# Patient Record
Sex: FEMALE | Race: WHITE | Employment: FULL TIME | URBAN - METROPOLITAN AREA
[De-identification: names, ages, dates, MRNs, and addresses within clinical notes are randomized per-mention and may not be internally consistent; named-entity substitution may affect disease eponyms.]

---

## 2017-09-12 ENCOUNTER — HOSPITAL ENCOUNTER (OUTPATIENT)
Dept: LAB | Age: 47
Discharge: HOME | End: 2017-09-12
Payer: COMMERCIAL

## 2017-09-12 ENCOUNTER — OFFICE VISIT (OUTPATIENT)
Dept: FAMILY MEDICINE CLINIC | Age: 47
End: 2017-09-12

## 2017-09-12 VITALS
HEART RATE: 78 BPM | HEIGHT: 67 IN | RESPIRATION RATE: 19 BRPM | DIASTOLIC BLOOD PRESSURE: 83 MMHG | WEIGHT: 161 LBS | BODY MASS INDEX: 25.27 KG/M2 | OXYGEN SATURATION: 100 % | TEMPERATURE: 98 F | SYSTOLIC BLOOD PRESSURE: 130 MMHG

## 2017-09-12 DIAGNOSIS — R82.90 ABNORMAL URINALYSIS: ICD-10-CM

## 2017-09-12 DIAGNOSIS — Z76.89 ENCOUNTER TO ESTABLISH CARE: ICD-10-CM

## 2017-09-12 DIAGNOSIS — L03.113 CELLULITIS OF RIGHT UPPER EXTREMITY: ICD-10-CM

## 2017-09-12 DIAGNOSIS — R82.90 CLOUDY URINE: ICD-10-CM

## 2017-09-12 DIAGNOSIS — N89.8 VAGINAL ITCHING: ICD-10-CM

## 2017-09-12 DIAGNOSIS — T63.304A SPIDER BITE, UNDETERMINED INTENT, INITIAL ENCOUNTER: Primary | ICD-10-CM

## 2017-09-12 LAB
BILIRUB UR QL STRIP: NEGATIVE
GLUCOSE UR-MCNC: NEGATIVE MG/DL
KETONES P FAST UR STRIP-MCNC: NEGATIVE MG/DL
PH UR STRIP: 6 [PH] (ref 4.6–8)
PROT UR QL STRIP: NEGATIVE MG/DL
SP GR UR STRIP: 1 (ref 1–1.03)
UA UROBILINOGEN AMB POC: NORMAL (ref 0.2–1)
URINALYSIS CLARITY POC: CLEAR
URINALYSIS COLOR POC: YELLOW
URINE BLOOD POC: NEGATIVE
URINE LEUKOCYTES POC: NORMAL
URINE NITRITES POC: POSITIVE

## 2017-09-12 PROCEDURE — 87077 CULTURE AEROBIC IDENTIFY: CPT | Performed by: NURSE PRACTITIONER

## 2017-09-12 PROCEDURE — 87186 SC STD MICRODIL/AGAR DIL: CPT | Performed by: NURSE PRACTITIONER

## 2017-09-12 PROCEDURE — 87070 CULTURE OTHR SPECIMN AEROBIC: CPT | Performed by: NURSE PRACTITIONER

## 2017-09-12 PROCEDURE — 87086 URINE CULTURE/COLONY COUNT: CPT | Performed by: NURSE PRACTITIONER

## 2017-09-12 RX ORDER — SULFAMETHOXAZOLE AND TRIMETHOPRIM 800; 160 MG/1; MG/1
1 TABLET ORAL 2 TIMES DAILY
Qty: 20 TAB | Refills: 0 | Status: SHIPPED | OUTPATIENT
Start: 2017-09-12 | End: 2017-09-22

## 2017-09-12 RX ORDER — IBUPROFEN 200 MG
TABLET ORAL
COMMUNITY

## 2017-09-12 RX ORDER — MICONAZOLE NITRATE 2 %
1 CREAM WITH APPLICATOR VAGINAL
Qty: 45 G | Refills: 0 | Status: SHIPPED | OUTPATIENT
Start: 2017-09-12 | End: 2017-09-19

## 2017-09-12 RX ORDER — ESCITALOPRAM OXALATE 10 MG/1
10 TABLET ORAL DAILY
COMMUNITY
End: 2022-02-21 | Stop reason: SDUPTHER

## 2017-09-12 NOTE — LETTER
NOTIFICATION RETURN TO WORK  
 
9/12/2017 9:51 AM 
 
Ms. Terrance Villasenor [address] [address] To Whom It May Concern: 
 
Terrance Villasenor is currently under the care of [address] [place] [address]. She will return to work on: September 13, 2017 If there are questions or concerns please have the patient contact our office.  
 
 
 
Sincerely, 
 
 
Leslye Jones NP

## 2017-09-12 NOTE — MR AVS SNAPSHOT
Visit Information Date & Time Provider Department Dept. Phone Encounter #  
 9/12/2017  9:00 AM Dg Luong NP [id number] [id number] [phone] Follow-up Instructions Return in about 10 days (around 9/22/2017) for if symptoms do not improve. Upcoming Health Maintenance Date Due  
 PAP AKA CERVICAL CYTOLOGY 9/17/2018 DTaP/Tdap/Td series (2 - Td) 7/10/2020 Allergies as of 9/12/2017  Review Complete On: 9/12/2017 By: Dg Luong NP Severity Noted Reaction Type Reactions Epinephrine High 09/12/2017   Intolerance Anaphylaxis Egg  11/03/2016    Hives Per pt Penicillamine  09/12/2017   Intolerance Hives Prednisone  09/12/2017   Intolerance Anxiety Current Immunizations  Never Reviewed No immunizations on file. Not reviewed this visit You Were Diagnosed With   
  
 Codes Comments Spider bite, undetermined intent, initial encounter    -  Primary ICD-10-CM: V34.508Y ICD-9-CM: 989.5, E980.9 Cloudy urine     ICD-10-CM: R82.90 ICD-9-CM: 791.9 Abnormal urinalysis     ICD-10-CM: R82.90 ICD-9-CM: 791.9 Vaginal itching     ICD-10-CM: L29.8 ICD-9-CM: 698.1 Encounter to establish care     ICD-10-CM: Z76.89 
ICD-9-CM: V65.8 Vitals BP Pulse Temp Resp Height(growth percentile) Weight(growth percentile) 130/83 (BP 1 Location: Right arm, BP Patient Position: Sitting) 78 98 °F (36.7 °C) (Oral) 19 5' 6.75\" (1.695 m) 161 lb (73 kg) LMP SpO2 BMI OB Status Smoking Status 08/21/2017 (Exact Date) 100% 25.41 kg/m2 Having regular periods Never Smoker Vitals History BMI and BSA Data Body Mass Index Body Surface Area  
 25.41 kg/m 2 1.85 m 2 Preferred Pharmacy Pharmacy Name Phone [id number] [phone] Your Updated Medication List  
  
   
This list is accurate as of: 9/12/17  9:52 AM.  Always use your most recent med list.  
  
  
  
  
 LEXAPRO 10 mg tablet Generic drug:  escitalopram oxalate Take 10 mg by mouth daily. miconazole 2 % vaginal cream  
Commonly known as:  Jah Necessary Insert 1 Applicator into vagina nightly for 7 days. MOTRIN  mg tablet Generic drug:  ibuprofen Take  by mouth. trimethoprim-sulfamethoxazole 160-800 mg per tablet Commonly known as:  BACTRIM DS, SEPTRA DS Take 1 Tab by mouth two (2) times a day for 10 days. Prescriptions Sent to Pharmacy Refills  
 trimethoprim-sulfamethoxazole (BACTRIM DS, SEPTRA DS) 160-800 mg per tablet 0 Sig: Take 1 Tab by mouth two (2) times a day for 10 days. Class: Normal  
 Pharmacy: [id number] Samson Landau, [address] [place] Ph #: [phone] Route: Oral  
 miconazole (MICOTIN) 2 % vaginal cream 0 Sig: Insert 1 Applicator into vagina nightly for 7 days. Class: Normal  
 Pharmacy: [facility] #[id number] Samson Landau, [address] [place] Ph #: [phone] Route: Vaginal  
  
We Performed the Following AMB POC URINALYSIS DIP STICK AUTO W/ MICRO [24921 CPT(R)] Follow-up Instructions Return in about 10 days (around 9/22/2017) for if symptoms do not improve. To-Do List   
 09/12/2017 Microbiology:  CULTURE, URINE   
  
 09/12/2017 Microbiology:  CULTURE, WOUND W GRAM STAIN Patient Instructions Insect Stings and Bites: Care Instructions Your Care Instructions Stings and bites from bees, wasps, ants, and other insects often cause pain, swelling, redness, and itching. In some people, especially children, the redness and swelling may be worse. It may extend several inches beyond the affected area. But in most cases, stings and bites don't cause reactions all over the body. If you have had a reaction to an insect sting or bite, you are at risk for a reaction if you get stung or bitten again. Follow-up care is a key part of your treatment and safety. Be sure to make and go to all appointments, and call your doctor if you are having problems. It's also a good idea to know your test results and keep a list of the medicines you take. How can you care for yourself at home? · Do not scratch or rub the skin where the sting or bite occurred. · Put a cold pack or ice cube on the area. Put a thin cloth between the ice and your skin. For some people, a paste of baking soda mixed with a little water helps relieve pain and decrease the reaction. · Take an over-the-counter antihistamine, such as diphenhydramine (Benadryl) or loratadine (Claritin), to relieve swelling, redness, and itching. Calamine lotion or hydrocortisone cream may also help. Do not give antihistamines to your child unless you have checked with the doctor first. 
· Be safe with medicines. If your doctor prescribed medicine for your allergy, take it exactly as prescribed. Call your doctor if you think you are having a problem with your medicine. You will get more details on the specific medicines your doctor prescribes. · Your doctor may prescribe a shot of epinephrine to carry with you in case you have a severe reaction. Learn how and when to give yourself the shot, and keep it with you at all times. Make sure it has not . · Go to the emergency room anytime you have a severe reaction. Go even if you have given yourself epinephrine and are feeling better. Symptoms can come back. When should you call for help? Call 911 anytime you think you may need emergency care. For example, call if: 
· You have symptoms of a severe allergic reaction. These may include: 
¨ Sudden raised, red areas (hives) all over your body. ¨ Swelling of the throat, mouth, lips, or tongue. ¨ Trouble breathing. ¨ Passing out (losing consciousness). Or you may feel very lightheaded or suddenly feel weak, confused, or restless. Call your doctor now or seek immediate medical care if: 
· You have symptoms of an allergic reaction not right at the sting or bite, such as: ¨ A rash or small area of hives (raised, red areas on the skin). ¨ Itching. ¨ Swelling. ¨ Belly pain, nausea, or vomiting. · You have a lot of swelling around the site (such as your entire arm or leg is swollen). · You have signs of infection, such as: 
¨ Increased pain, swelling, redness, or warmth around the sting. ¨ Red streaks leading from the area. ¨ Pus draining from the sting. ¨ A fever. Watch closely for changes in your health, and be sure to contact your doctor if: 
· You do not get better as expected. Where can you learn more? Go to http://brenden-logan.info/. Enter P390 in the search box to learn more about \"Insect Stings and Bites: Care Instructions. \" Current as of: March 20, 2017 Content Version: 11.3 © 9669-0767 [employer]. Care instructions adapted under license by [employer] (which disclaims liability or warranty for this information). If you have questions about a medical condition or this instruction, always ask your healthcare professional. [address] any warranty or liability for your use of this information. Introducing [facility] [place] [facility]! Charisse Escoto introduces [employer] patient portal. Now you can access parts of your medical record, email your doctor's office, and request medication refills online. 1. In your internet browser, go to [email or web address] 2. Click on the First Time User? Click Here link in the Sign In box. You will see the New Member Sign Up page. 3. Enter your [employer] Access Code exactly as it appears below. You will not need to use this code after youve completed the sign-up process. If you do not sign up before the expiration date, you must request a new code. · [employer] Access Code: [email or web address] Expires: 12/11/2017  8:57 AM 
 
4. Enter the last four digits of your Social Security Number (xxxx) and Date of Birth (mm/dd/yyyy) as indicated and click Submit. You will be taken to the next sign-up page. 5. Create a The Bul[employer] Tracker ID. This will be your [employer] login ID and cannot be changed, so think of one that is secure and easy to remember. 6. Create a [employer] password. You can change your password at any time. 7. Enter your Password Reset Question and Answer. This can be used at a later time if you forget your password. 8. Enter your e-mail address. You will receive e-mail notification when new information is available in [address] 9. Click Sign Up. You can now view and download portions of your medical record. 10. Click the Download Summary menu link to download a portable copy of your medical information. If you have questions, please visit the Frequently Asked Questions section of the [employer] website. Remember, [employer] is NOT to be used for urgent needs. For medical emergencies, dial 911. Now available from your iPhone and Android! Please provide this summary of care documentation to your next provider. Your primary care clinician is listed as Jessi Edwards. If you have any questions after today's visit, please call [phone].

## 2017-09-12 NOTE — PROGRESS NOTES
Johan May is a 50 y.o.  female and presents with    Chief Complaint   Patient presents with    Insect Bite    Other     cloudy urine    Establish Care       Subjective:  Ms. Елена Villalpando presents today with complaints of spider bite. She states about 11 days ago she was doing yard work and saw a bite on her right forearm. She went to [facility] and was prescribed Keflex for 7 days. Initially, site was warm, red, tender, and oozing. It has been 2 days since she completed her antibiotics. The site is minimally red with some tenderness. Area of induration has decreased. She has noticed some  bloody drainage and yellowish fluid. She state her temperature yesterday was 100F. She took motrin and it brought her temperature down. She has been having very cloudy urine but no urine frequency, urgency, or dysuria. She has history of UTI in the past. She has taken macrodantin with good relief in the past.         Additional Concerns: Ms. Елена Villalpando is here to establish care. There is no problem list on file for this patient. Current Outpatient Prescriptions   Medication Sig Dispense Refill    escitalopram oxalate (LEXAPRO) 10 mg tablet Take 10 mg by mouth daily.  ibuprofen (MOTRIN IB) 200 mg tablet Take  by mouth.        Allergies   Allergen Reactions    Epinephrine Anaphylaxis    Egg Hives     Per pt    Penicillamine Hives    Prednisone Anxiety     Past Medical History:   Diagnosis Date    Bell's palsy     Depression      Past Surgical History:   Procedure Laterality Date    HX GYN       x 3    HX GYN      cerclage     Family History   Problem Relation Age of Onset   Maddox Bogus COPD Mother     Diabetes Mother     Hypertension Mother     Cancer Father      Metastatic Lung Cancer    Alcohol abuse Father     Thyroid Disease Father     Diabetes Sister     Cancer Brother      Prostate Cancer    Diabetes Sister     Hypertension Sister      Social History   Substance Use Topics    Smoking status: Never Smoker    Smokeless tobacco: Never Used    Alcohol use No       ROS   History obtained from the patient  General ROS: positive for  - fever and malaise  Respiratory ROS: no cough, shortness of breath, or wheezing  Cardiovascular ROS: no chest pain or dyspnea on exertion  Gastrointestinal ROS: positive for - nausea/vomiting  Genito-Urinary ROS: positive for - cloudy urine and vaginal itching  Dermatological ROS: positive for - spider bite to right forearm    All other systems reviewed and are negative.       Objective:  Vitals:    09/12/17 0913 09/12/17 0916   BP: 126/90 130/83   Pulse: 78    Resp: 19    Temp: 98 °F (36.7 °C)    TempSrc: Oral    SpO2: 100%    Weight: 161 lb (73 kg)    Height: 5' 6.75\" (1.695 m)    PainSc:   5    PainLoc: Arm    LMP: 08/21/2017       PE  General appearance - alert, well appearing, and in no distress  Mental status - normal mood, behavior, speech, dress, motor activity, and thought processes  Chest - clear to auscultation, no wheezes, rales or rhonchi, symmetric air entry  Heart - normal rate and regular rhythm  Abdomen - soft, nontender, nondistended, no masses or organomegaly  Skin - puncture marks noted to right forearm with mild erythema; area of induration; site draining serosanguinous drainage         LABS   9/12/2017  9:36 AM - Jose El LPN   Component Results   Component Value Flag Ref Range Units Status   Color (UA POC) Yellow    Final   Clarity (UA POC) Clear    Final   Glucose (UA POC) Negative  Negative  Final   Bilirubin (UA POC) Negative  Negative  Final   Ketones (UA POC) Negative  Negative  Final   Specific gravity (UA POC) 1.005  1.001 - 1.035  Final   Blood (UA POC) Negative  Negative  Final   pH (UA POC) 6.0  4.6 - 8.0  Final   Protein (UA POC) Negative  Negative mg/dL Final   Urobilinogen (UA POC) 0.2 mg/dL  0.2 - 1  Final   Nitrites (UA POC) Positive  Negative  Final   Leukocyte esterase (UA POC) 2+  Negative  Final Assessment/Plan:    1. Spider Bite/ RUE Cellulitis- still symptomatic despite keflex x 7 days; wound drainage culture to r/o MRSA; bactrim x 10 days; warm compress to site and keep elevated; if area of induration still present will need I&D with Dr. Colonel Cannon    2. Cloudy Urine/Abnormal UA- UA + for nitrites and leukocytes; urine for culture; bactrim x 10 days    3. Vaginal Itching- possibly antibiotic induced yeast infection; unable to prescribe fluconazole due to contraindication with lexapro; miconazole vaginal cream x 7 days    Lab review: orders written for new lab studies as appropriate; see orders    Today's Visit: Wound culture, Bactrim, Miconazole cream    Health Maintenance:   Up to date    I have discussed the diagnosis with the patient and the intended plan as seen in the above orders. The patient has received an after-visit summary and questions were answered concerning future plans. I have discussed medication side effects and warnings with the patient as well. I have reviewed the plan of care with the patient, accepted their input and they are in agreement with the treatment goals. Follow-up Disposition:  Return in about 10 days (around 9/22/2017) for if symptoms do not improve. More than 1/2 of this 30 minute visit was spent in counseling and coordination of care, as described above.     YULY North

## 2017-09-12 NOTE — PROGRESS NOTES
SUBJECTIVE:  Jarret Talbert is a 50 y.o. female who presents today to establish care and for c/o of spider bite    1. Have you been to the ER, urgent care clinic since your last visit? Hospitalized since your last visit? NO    2. Have you seen or consulted any other health care providers outside of the [address] [place] [address] since your last visit? Include any pap smears or colon screening. NO    Health Maintenance reviewed  yes    Health Maintenance Due   Topic Date Due    DTaP/Tdap/Td series (1 - Tdap) 04/19/1989    PAP AKA CERVICAL CYTOLOGY  08/31/2015    INFLUENZA AGE 9 TO ADULT  08/01/2017

## 2017-09-12 NOTE — PATIENT INSTRUCTIONS
Insect Stings and Bites: Care Instructions  Your Care Instructions  Stings and bites from bees, wasps, ants, and other insects often cause pain, swelling, redness, and itching. In some people, especially children, the redness and swelling may be worse. It may extend several inches beyond the affected area. But in most cases, stings and bites don't cause reactions all over the body. If you have had a reaction to an insect sting or bite, you are at risk for a reaction if you get stung or bitten again. Follow-up care is a key part of your treatment and safety. Be sure to make and go to all appointments, and call your doctor if you are having problems. It's also a good idea to know your test results and keep a list of the medicines you take. How can you care for yourself at home? · Do not scratch or rub the skin where the sting or bite occurred. · Put a cold pack or ice cube on the area. Put a thin cloth between the ice and your skin. For some people, a paste of baking soda mixed with a little water helps relieve pain and decrease the reaction. · Take an over-the-counter antihistamine, such as diphenhydramine (Benadryl) or loratadine (Claritin), to relieve swelling, redness, and itching. Calamine lotion or hydrocortisone cream may also help. Do not give antihistamines to your child unless you have checked with the doctor first.  · Be safe with medicines. If your doctor prescribed medicine for your allergy, take it exactly as prescribed. Call your doctor if you think you are having a problem with your medicine. You will get more details on the specific medicines your doctor prescribes. · Your doctor may prescribe a shot of epinephrine to carry with you in case you have a severe reaction. Learn how and when to give yourself the shot, and keep it with you at all times. Make sure it has not . · Go to the emergency room anytime you have a severe reaction.  Go even if you have given yourself epinephrine and are feeling better. Symptoms can come back. When should you call for help? Call 911 anytime you think you may need emergency care. For example, call if:  · You have symptoms of a severe allergic reaction. These may include:  ¨ Sudden raised, red areas (hives) all over your body. ¨ Swelling of the throat, mouth, lips, or tongue. ¨ Trouble breathing. ¨ Passing out (losing consciousness). Or you may feel very lightheaded or suddenly feel weak, confused, or restless. Call your doctor now or seek immediate medical care if:  · You have symptoms of an allergic reaction not right at the sting or bite, such as:  ¨ A rash or small area of hives (raised, red areas on the skin). ¨ Itching. ¨ Swelling. ¨ Belly pain, nausea, or vomiting. · You have a lot of swelling around the site (such as your entire arm or leg is swollen). · You have signs of infection, such as:  ¨ Increased pain, swelling, redness, or warmth around the sting. ¨ Red streaks leading from the area. ¨ Pus draining from the sting. ¨ A fever. Watch closely for changes in your health, and be sure to contact your doctor if:  · You do not get better as expected. Where can you learn more? Go to http://brenden-logan.info/. Enter P390 in the search box to learn more about \"Insect Stings and Bites: Care Instructions. \"  Current as of: March 20, 2017  Content Version: 11.3  © 0364-9232 [employer]. Care instructions adapted under license by [employer] (which disclaims liability or warranty for this information). If you have questions about a medical condition or this instruction, always ask your healthcare professional. [place] [address] any warranty or liability for your use of this information.

## 2017-09-14 ENCOUNTER — TELEPHONE (OUTPATIENT)
Dept: FAMILY MEDICINE CLINIC | Age: 47
End: 2017-09-14

## 2017-09-14 LAB
BACTERIA SPEC CULT: ABNORMAL
SERVICE CMNT-IMP: ABNORMAL

## 2017-09-14 NOTE — TELEPHONE ENCOUNTER
Call placed to patient to discuss most recent lab results. Urine Culture + for E. Coli- please continue to take Bactrim as prescribed. Wound culture- preliminary results show no organisms. Patient did not answer.  left for patient to call office back.      If still having area of induration on right forearm will need I&D- patient will need to see Dr. Maurizio Majano on 9/22 (Dr. Maurizio Majano already aware)

## 2017-09-14 NOTE — TELEPHONE ENCOUNTER
Aware of lab results. Will continue with bactrim for UTI. Still has area of induration to right forearm. Will call next Wednesday and if still present will be placed on Dr. Jeremy Peterson schedule for I&D. No additional questions or concerns at this time.

## 2017-09-15 LAB
BACTERIA SPEC CULT: ABNORMAL
BACTERIA SPEC CULT: ABNORMAL
GRAM STN SPEC: ABNORMAL
GRAM STN SPEC: ABNORMAL
SERVICE CMNT-IMP: ABNORMAL

## 2017-09-21 ENCOUNTER — TELEPHONE (OUTPATIENT)
Dept: FAMILY MEDICINE CLINIC | Age: 47
End: 2017-09-21

## 2017-09-21 NOTE — TELEPHONE ENCOUNTER
Called Ms Isai Esparza with a follow up on how she is doing. Unable to leave a voicemail for patient because her mail box is full. Will attempt at a later time during the day.

## 2017-09-22 ENCOUNTER — TELEPHONE (OUTPATIENT)
Dept: FAMILY MEDICINE CLINIC | Age: 47
End: 2017-09-22

## 2017-09-22 NOTE — TELEPHONE ENCOUNTER
Called Ms Herbert Lara to follow up on her visit for the allergic reaction to the bee sting. Patient states that it is better and that it is going down and that she was very satisfied with her treatment at [facility]. No other issues or complaints at this time. Closing encounter.

## 2021-07-14 ENCOUNTER — TRANSCRIBE ORDER (OUTPATIENT)
Dept: SCHEDULING | Age: 51
End: 2021-07-14

## 2021-07-14 ENCOUNTER — HOSPITAL ENCOUNTER (OUTPATIENT)
Dept: PHYSICAL THERAPY | Age: 51
Discharge: HOME | End: 2021-07-14
Payer: OTHER MISCELLANEOUS

## 2021-07-14 DIAGNOSIS — M25.562 LEFT KNEE PAIN: Primary | ICD-10-CM

## 2021-07-14 DIAGNOSIS — M25.562 PAIN IN LEFT KNEE: Primary | ICD-10-CM

## 2021-07-14 PROCEDURE — 97110 THERAPEUTIC EXERCISES: CPT

## 2021-07-14 PROCEDURE — 97140 MANUAL THERAPY 1/> REGIONS: CPT

## 2021-07-14 PROCEDURE — 97161 PT EVAL LOW COMPLEX 20 MIN: CPT

## 2021-07-14 NOTE — PROGRESS NOTES
[address] [facility] PHYSICAL THERAPY AT [place]  [address] [facility] [address] [facility], Marvin Chahal [place], [address] - Phone: [phone]  Fax: [phone] / [address] [facility]  Patient Name: Ronaldo Laws : 1970   Treatment   Diagnosis: Low back pain and L knee pain Medical   Diagnosis: Low back pain [M54.5]  Left knee pain [M25.562]   Onset Date: 2021 and 2021     Referral Source: SHEILA [facility]): 2021   Prior Hospitalization: See medical history Provider #: [id number]   Prior Level of Function: Pt was pain free with ADLs   Comorbidities: None reported   Medications: Verified on Patient Summary List   The Plan of Care and following information is based on the information from the initial evaluation.   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 Pt undergoing MRI for the L knee tonight and testing for LLE blood clot on . He/she presents with pain ranging from 2-8/10, located in the R side of the low back and down into the RLE as far down as the posterior knee as well as L knee pain that can have pain into the L foot. Pain is made worse with weightbearing on the L knee and bending/twisting/transfers in the R low back, better with ice and pain meds. Pt has radicular sxs in the BLE, described as aching . L/S AROM: flexion 50% with slight increased in BLE, extension 50%, LSB 75% with pulling on the R, RSB 75%, LRotation WNL RRotation WNL. Palpation reveals significant TTP to R QL and paraspinals.  Pt shows R inferior ASIS compared to contralateral side indicative of possible R anteriorly rotated innominate. MMT LEs: L grossly 4-/5, R grossly 4+/5. Core stability is impaired . Repeated movement testing reveals slight increase in pain with flexion. (-) Special tests include: valgus stress tests. (+) Special tests include: Passive SLR on BLE, slump test bilaterally, neural tension on LLE, McMurrays test on L knee, Thessaly test on L knee. Pt will benefit from PT interventions to address the aforementioned deficits and allow pt to return to PLOF. Eval Complexity: History LOW Complexity : Zero comorbidities / personal factors that will impact the outcome / POC;  Examination  LOW Complexity : 1-2 Standardized tests and measures addressing body structure, function, activity limitation and / or participation in recreation ; Presentation LOW Complexity : Stable, uncomplicated ;  Decision Making MEDIUM Complexity : FOTO score of 26-74; Overall Complexity LOW   ==================================================================================  Problem List: pain affecting function, decrease ROM, decrease strength, decrease ADL/ functional abilitiies, decrease activity tolerance and decrease flexibility/ joint mobility   Treatment Plan may include any combination of the following: Therapeutic exercise, Therapeutic activities, Neuromuscular re-education, Physical agent/modality, Gait/balance training, Manual therapy, Patient education, Self Care training and Functional mobility training  Patient / Family readiness to learn indicated by: asking questions, trying to perform skills and interest  Persons(s) to be included in education: patient (P)  Barriers to Learning/Limitations: no  Measures taken:    Patient Goal (s): \"full mobility\"   Patient self reported health status: excellent  Rehabilitation Potential: good   Short Term Goals: To be accomplished in  3  weeks:  1.  Pt will be independent and compliant with HEP to decrease pain, increase ROM and return pt to OF. 2. Pt will demonstrate a GROC score of >/= +2 to show overall improvement in function     Long Term Goals: To be accomplished in  6  weeks:  1. Increase score on FOTO to > or = 60 to demo an increase in functional activity tolerance. 2. Pt will note < or = 2/10 pain with all mobility to improve comfort with ADLs. 3. Pt will demonstrate a GROC score of >/= +5 to show overall improvement in function  Frequency / Duration:   Patient to be seen  2  times per week for 6  weeks:  Patient / Caregiver education and instruction: self care, activity modification and exercises    Therapist Signature: Kaye Olmos PT Date: 8/47/6058   Certification Period: - Time: 2:28 PM   ===========================================================================================  I certify that the above Physical Therapy Services are being furnished while the patient is under my care. I agree with the treatment plan and certify that this therapy is necessary. Physician Signature:        Date:       Time:        SHEILA Kingsley    Please sign and return to [facility] [place] or you may fax the signed copy to [phone]. Thank you.

## 2021-07-15 ENCOUNTER — TRANSCRIBE ORDER (OUTPATIENT)
Dept: SCHEDULING | Age: 51
End: 2021-07-15

## 2021-07-16 ENCOUNTER — HOSPITAL ENCOUNTER (OUTPATIENT)
Dept: VASCULAR SURGERY | Age: 51
Discharge: HOME | End: 2021-07-16
Attending: PHYSICIAN ASSISTANT
Payer: OTHER MISCELLANEOUS

## 2021-07-16 DIAGNOSIS — M25.562 LEFT KNEE PAIN: ICD-10-CM

## 2021-07-16 PROCEDURE — 93971 EXTREMITY STUDY: CPT

## 2021-07-19 ENCOUNTER — HOSPITAL ENCOUNTER (OUTPATIENT)
Age: 51
Discharge: HOME | End: 2021-07-19
Attending: PHYSICIAN ASSISTANT
Payer: OTHER MISCELLANEOUS

## 2021-07-19 DIAGNOSIS — M25.562 PAIN IN LEFT KNEE: ICD-10-CM

## 2021-07-19 PROCEDURE — 73721 MRI JNT OF LWR EXTRE W/O DYE: CPT

## 2021-07-21 ENCOUNTER — HOSPITAL ENCOUNTER (OUTPATIENT)
Dept: PHYSICAL THERAPY | Age: 51
Discharge: HOME | End: 2021-07-21
Payer: OTHER MISCELLANEOUS

## 2021-07-21 PROCEDURE — 97140 MANUAL THERAPY 1/> REGIONS: CPT

## 2021-07-21 PROCEDURE — 97110 THERAPEUTIC EXERCISES: CPT

## 2021-07-21 NOTE — PROGRESS NOTES
PHYSICAL THERAPY - DAILY TREATMENT NOTE    Patient Name: Ronaldo Laws        Date: 2021  : 1970    Patient  Verified: YES  Visit #:   2   of   12  Insurance: Payor: Jay Gibbons / Plan: [id number] / Product Type: Workers Comp /      In time: 5:20 Out time: 6:30   Total Treatment Time: 70     Medicare Time Tracking (below)   Total Timed Codes (min):  - 1:1 Treatment Time:  -     TREATMENT AREA/ DIAGNOSIS = Low back pain [M54.5]  Left knee pain [M25.562]    SUBJECTIVE  Pain Level (on 0 to 10 scale):  6  / 10   Medication Changes/New allergies or changes in medical history, any new surgeries or procedures? NO    If yes, update Summary List   Subjective Functional Status/Changes:  []  No changes reported     Pt reports achy and buckling in the L knee with walking.  Pt states the Low back has been hurting a lot      OBJECTIVE  Modalities Rationale:     decrease inflammation and decrease pain to improve patient's ability to perform ADLs without pain   min [] Estim, type/location:                                      []  att     []  unatt     []  w/US     []  w/ice    []  w/heat    min []  Mechanical Traction: type/lbs                   []  pro   []  sup   []  int   []  cont    []  before manual    []  after manual    min []  Ultrasound, settings/location:      min []  Iontophoresis w/ dexamethasone, location:                                               []  take home patch       []  in clinic   10 min [x]  Ice     []  Heat    location/position: L/S    min []  Vasopneumatic Device, press/temp:     min []  Other:    [x] Skin assessment post-treatment (if applicable):    [x]  intact    []  redness- no adverse reaction     []redness  adverse reaction:        35 min Therapeutic Exercise:  [x]  See flow sheet   Rationale:      increase ROM and increase strength to improve the patients ability to perform ADLs without pain     25 min Manual Therapy: STM to L/S. Meniscus mobilization. TFL release. PROM to L knee   Rationale:      decrease pain, increase ROM and increase tissue extensibility to improve patient's ability to perform ADLs without pain  The manual therapy interventions were performed at a separate and distinct time from the therapeutic activities interventions    Billed With/As:   [x] TE   [] TA   [] Neuro   [] Self Care Patient Education: [x] Review HEP    [] Progressed/Changed HEP based on:   [] positioning   [] body mechanics   [] transfers   [] heat/ice application    [] other:        Other Objective/Functional Measures:    Significant TTP to R side paraspinals and QL. Post Treatment Pain Level (on 0 to 10) scale:   0  / 10     ASSESSMENT  Assessment/Changes in Function:     Pt educated on meniscus tear progression     []  See Progress Note/Recertification   Patient will continue to benefit from skilled PT services to modify and progress therapeutic interventions, address functional mobility deficits, address ROM deficits, address strength deficits, analyze and address soft tissue restrictions and analyze and cue movement patterns to attain remaining goals.    Progress toward goals / Updated goals:    Good Progress to    [] STG    [x] LTG  1 as shown by improved pain levels since initial evaluation     PLAN  [x]  Upgrade activities as tolerated YES Continue plan of care   []  Discharge due to :    []  Other:      Therapist: Fox Mg DPT     Date: 7/21/2021 Time: 9:49 AM        Future Appointments   Date Time Provider [place]   7/21/2021  5:15 PM [facility] [address]   7/26/2021  6:00 PM Hiral Bolus [address]   7/28/2021  5:15 PM [facility] [address]   8/2/2021  6:00 PM [place] [id number]

## 2021-07-26 ENCOUNTER — HOSPITAL ENCOUNTER (OUTPATIENT)
Dept: PHYSICAL THERAPY | Age: 51
Discharge: HOME | End: 2021-07-26
Payer: OTHER MISCELLANEOUS

## 2021-07-26 PROCEDURE — 97110 THERAPEUTIC EXERCISES: CPT

## 2021-07-26 PROCEDURE — 97140 MANUAL THERAPY 1/> REGIONS: CPT

## 2021-07-26 NOTE — PROGRESS NOTES
PHYSICAL THERAPY - DAILY TREATMENT NOTE    Patient Name: Tavares Bachelor        Date: 2021  : 1970    Patient  Verified: YES  Visit #:   3   of   12  Insurance: Payor: Orin Nurse / Plan: [id number] [facility] / Product Type: Workers Comp /      In time: 6:00 Out time: 7:00   Total Treatment Time: 60     Medicare Time Tracking (below)   Total Timed Codes (min):  - 1:1 Treatment Time:  -     TREATMENT AREA/ DIAGNOSIS = Low back pain [M54.5]  Left knee pain [M25.562]    SUBJECTIVE  Pain Level (on 0 to 10 scale):  5  / 10   Medication Changes/New allergies or changes in medical history, any new surgeries or procedures? NO    If yes, update Summary List   Subjective Functional Status/Changes:  []  No changes reported     Pt reports that her knee locked over the weekend and she was in a ton of pain      OBJECTIVE  Modalities Rationale:     decrease inflammation to improve patient's ability to perform ADLs without pain   min [] Estim, type/location:                                      []  att     []  unatt     []  w/US     []  w/ice    []  w/heat    min []  Mechanical Traction: type/lbs                   []  pro   []  sup   []  int   []  cont    []  before manual    []  after manual    min []  Ultrasound, settings/location:      min []  Iontophoresis w/ dexamethasone, location:                                               []  take home patch       []  in clinic   10 min [x]  Ice     []  Heat    location/position:     min []  Vasopneumatic Device, press/temp:     min []  Other:    [x] Skin assessment post-treatment (if applicable):    [x]  intact    []  redness- no adverse reaction     []redness  adverse reaction:        25 min Therapeutic Exercise:  [x]  See flow sheet   Rationale:      increase ROM and increase strength to improve the patients ability to perform ADLs without pain     25 min Manual Therapy: STM to L/S. TFL release.  PROM to L knee   Rationale: decrease pain, increase ROM and increase tissue extensibility to improve patient's ability to perform ADLs without pain  The manual therapy interventions were performed at a separate and distinct time from the therapeutic activities interventions    Billed With/As:   [x] TE   [] TA   [] Neuro   [] Self Care Patient Education: [x] Review HEP    [] Progressed/Changed HEP based on:   [] positioning   [] body mechanics   [] transfers   [] heat/ice application    [] other:        Other Objective/Functional Measures:    TTP to R sided paraspinals    Post Treatment Pain Level (on 0 to 10) scale:   0  / 10     ASSESSMENT  Assessment/Changes in Function:     Pt states she is feeling a little better overall with the back but the knee is still hurting. Pt states she wants to avoid surgery at all costs     []  See Progress Note/Recertification   Patient will continue to benefit from skilled PT services to modify and progress therapeutic interventions, address functional mobility deficits, address ROM deficits, address strength deficits and analyze and address soft tissue restrictions to attain remaining goals.    Progress toward goals / Updated goals:    Good Progress to    [] STG    [x] LTG  1 as shown by improved overall pain levels since onset of care     PLAN  [x]  Upgrade activities as tolerated YES Continue plan of care   []  Discharge due to :    []  Other:      Therapist: Ambrocio Hernandez DPT     Date: 7/26/2021 Time: 6:57 PM        Future Appointments   Date Time Provider Evans Amaya   7/28/2021  5:15 PM Shweta Buchanan [facility]   8/2/2021  6:00 PM Aric Harman

## 2021-08-04 ENCOUNTER — HOSPITAL ENCOUNTER (OUTPATIENT)
Dept: PHYSICAL THERAPY | Age: 51
Discharge: HOME | End: 2021-08-04
Payer: OTHER MISCELLANEOUS

## 2021-08-04 ENCOUNTER — APPOINTMENT (OUTPATIENT)
Dept: PHYSICAL THERAPY | Age: 51
End: 2021-08-04
Payer: OTHER MISCELLANEOUS

## 2021-08-04 PROCEDURE — 97110 THERAPEUTIC EXERCISES: CPT

## 2021-08-04 PROCEDURE — 97140 MANUAL THERAPY 1/> REGIONS: CPT

## 2021-08-04 NOTE — PROGRESS NOTES
PHYSICAL THERAPY - DAILY TREATMENT NOTE    Patient Name: Fernando Treviño        Date: 2021  : 1970    Patient  Verified: YES  Visit #:   4   of   12  Insurance: Payor: /      In time: 6:00 Out time: 7:05   Total Treatment Time: 72     Medicare Time Tracking (below)   Total Timed Codes (min):  - 1:1 Treatment Time:  -     TREATMENT AREA/ DIAGNOSIS = Low back pain [M54.5]  Left knee pain [M25.562]    SUBJECTIVE  Pain Level (on 0 to 10 scale):  1-2 back, 2-3 knee  / 10   Medication Changes/New allergies or changes in medical history, any new surgeries or procedures? NO    If yes, update Summary List   Subjective Functional Status/Changes:  []  No changes reported     Pt reports her back is feeling much better. The L knee feels like it is stiff and wants to lock still      OBJECTIVE  Modalities Rationale:     decrease inflammation and decrease pain to improve patient's ability to perform ADLs without pain   min [] Estim, type/location:                                      []  att     []  unatt     []  w/US     []  w/ice    []  w/heat    min []  Mechanical Traction: type/lbs                   []  pro   []  sup   []  int   []  cont    []  before manual    []  after manual    min []  Ultrasound, settings/location:      min []  Iontophoresis w/ dexamethasone, location:                                               []  take home patch       []  in clinic   10 min [x]  Ice     []  Heat    location/position: L knee and back     min []  Vasopneumatic Device, press/temp:     min []  Other:    [x] Skin assessment post-treatment (if applicable):    [x]  intact    []  redness- no adverse reaction     []redness  adverse reaction:        40 min Therapeutic Exercise:  [x]  See flow sheet   Rationale:      increase strength and improve coordination to improve the patients ability to perform ADLs without pain     15 min Manual Therapy: STM to L/S. STM to L knee.     Rationale:      decrease pain, increase ROM and increase tissue extensibility to improve patient's ability to perform ADLs without pain  The manual therapy interventions were performed at a separate and distinct time from the therapeutic activities interventions      Billed With/As:   [x] TE   [] TA   [] Neuro   [] Self Care Patient Education: [x] Review HEP    [] Progressed/Changed HEP based on:   [] positioning   [] body mechanics   [] transfers   [] heat/ice application    [] other:        Other Objective/Functional Measures:    TTP to R paraspinals  Initiated lateral band walks and TG squats    Post Treatment Pain Level (on 0 to 10) scale:   0  / 10     ASSESSMENT  Assessment/Changes in Function:     Pt showing improved activity tolerance     []  See Progress Note/Recertification   Patient will continue to benefit from skilled PT services to modify and progress therapeutic interventions, address functional mobility deficits, address ROM deficits, address strength deficits, analyze and address soft tissue restrictions and analyze and cue movement patterns to attain remaining goals.    Progress toward goals / Updated goals:    Good Progress to    [] STG    [x] LTG  1 as shown by improved overall pain levels with ADLs      PLAN  [x]  Upgrade activities as tolerated YES Continue plan of care   []  Discharge due to :    []  Other:      Therapist: Andria Corbin DPT     Date: 8/4/2021 Time: 10:44 AM        Future Appointments   Date Time Provider Evans Amaya   8/4/2021  6:00 PM Dago Campion [facility]

## 2021-08-09 ENCOUNTER — APPOINTMENT (OUTPATIENT)
Dept: PHYSICAL THERAPY | Age: 51
End: 2021-08-09
Payer: OTHER MISCELLANEOUS

## 2021-08-11 ENCOUNTER — HOSPITAL ENCOUNTER (OUTPATIENT)
Dept: PHYSICAL THERAPY | Age: 51
End: 2021-08-11
Payer: OTHER MISCELLANEOUS

## 2021-08-11 ENCOUNTER — APPOINTMENT (OUTPATIENT)
Dept: PHYSICAL THERAPY | Age: 51
End: 2021-08-11
Payer: OTHER MISCELLANEOUS

## 2021-08-16 ENCOUNTER — APPOINTMENT (OUTPATIENT)
Dept: PHYSICAL THERAPY | Age: 51
End: 2021-08-16
Payer: OTHER MISCELLANEOUS

## 2021-08-16 ENCOUNTER — HOSPITAL ENCOUNTER (OUTPATIENT)
Dept: PHYSICAL THERAPY | Age: 51
Discharge: HOME | End: 2021-08-16
Payer: OTHER MISCELLANEOUS

## 2021-08-16 PROCEDURE — 97140 MANUAL THERAPY 1/> REGIONS: CPT

## 2021-08-16 PROCEDURE — 97110 THERAPEUTIC EXERCISES: CPT

## 2021-08-16 PROCEDURE — 97112 NEUROMUSCULAR REEDUCATION: CPT

## 2021-08-17 NOTE — PROGRESS NOTES
[address] [facility] PHYSICAL THERAPY AT [address] [facility], [address] [place] [address], [address], [address] [place] [address]  Phone: [phone]  Fax: [phone]  PROGRESS NOTE  Patient Name: Donaldo Pandya : 1970   Treatment/Medical Diagnosis: Low back pain [M54.5]  Left knee pain [M25.562]   Referral Source: SHEILA Rubin     Date of Initial Visit: 2021 Attended Visits: 5 Missed Visits: 2     SUMMARY OF TREATMENT  PT consisted of manual therapy techniques, therapeutic exercises, and modalities to improve strength, improve mobility, decrease pain, and improve overall function. CURRENT STATUS  Pt has shown good overall improvement since onset of care. Pt demonstrates full ROM in the L knee. Pt still has increased pain after prolonged weightbearing. Pt states she has confirmed L meniscus tear and was recently seen by ortho and was told to continue PT for the L knee. Pt has some joint line tenderness. Pt also showing improved overall LBP. Pt still reports having some stiffness and soreness after prolonged working and standing. Pt still demonstrates decreased functional strength and core stability. Goal/Measure of Progress Goal Met? 1. Increase score on FOTO to > or = 60 to demo an increase in functional activity tolerance   Status at last Eval: 43 Current Status: DNA no   2. Pt will note < or = 2/10 pain with all mobility to improve comfort with ADLs. Status at last Eval: 4-8 Current Status: 1-6 no   3. Pt will demonstrate a GROC score of >/= +5 to show overall improvement in function   Status at last Eval: NA Current Status: DNA n/a     New Goals to be achieved in __6__  weeks:  1. Continue goals above   2.  -   3.  -   4.  -       RECOMMENDATIONS  Pt would benefit from continued PT 2x a week for 4-6 weeks to improve overall pain levels and improve pts ability to perform pain free ADLs. If you have any questions/comments please contact us directly at [phone]. Thank you for allowing us to assist in the care of your patient. Therapist Signature: Priyanka Pino Date: 8/17/2021     Time: 6:59 AM   NOTE TO PHYSICIAN:  PLEASE COMPLETE THE ORDERS BELOW AND FAX TO   [facility] at [address]: [phone]. If you are unable to process this request in 24 hours please contact our office: [phone].    ___ I have read the above report and request that my patient continue as recommended.   ___ I have read the above report and request that my patient continue therapy with the following changes/special instructions:_________________________________________________________   ___ I have read the above report and request that my patient be discharged from therapy.      Physician Signature:        Date:       Time:        SHEILA Wise

## 2021-08-17 NOTE — PROGRESS NOTES
PHYSICAL THERAPY - DAILY TREATMENT NOTE    Patient Name: Sumit Cooper        Date: 2021  : 1970    Patient  Verified: YES  Visit #:   5   of   6  Insurance: Payor: Megan Conrad / Plan: Chiqui Dozier COMP / Product Type: Workers Comp /      In time: 5:15 Out time: 6:15   Total Treatment Time: 61     Medicare Time Tracking (below)   Total Timed Codes (min):  - 1:1 Treatment Time:  -     TREATMENT AREA/ DIAGNOSIS = Low back pain [M54.5]  Left knee pain [M25.562]    SUBJECTIVE  Pain Level (on 0 to 10 scale):  2-3  / 10   Medication Changes/New allergies or changes in medical history, any new surgeries or procedures?     NO    If yes, update Summary List   Subjective Functional Status/Changes:  []  No changes reported     See PN      OBJECTIVE  Modalities Rationale:     decrease inflammation and decrease pain to improve patient's ability to perform ADLs without pain   min [] Estim, type/location:                                      []  att     []  unatt     []  w/US     []  w/ice    []  w/heat    min []  Mechanical Traction: type/lbs                   []  pro   []  sup   []  int   []  cont    []  before manual    []  after manual    min []  Ultrasound, settings/location:      min []  Iontophoresis w/ dexamethasone, location:                                               []  take home patch       []  in clinic   10 min [x]  Ice     []  Heat    location/position: L knee and L/S    min []  Vasopneumatic Device, press/temp:     min []  Other:    [x] Skin assessment post-treatment (if applicable):    [x]  intact    []  redness- no adverse reaction     []redness  adverse reaction:        30 min Therapeutic Exercise:  [x]  See flow sheet   Rationale:      increase ROM and increase strength to improve the patients ability to perform ADLs without pain     15 min Manual Therapy: STM to L/S   Rationale:      decrease pain, increase ROM and increase tissue extensibility to improve patient's ability to perform ADLs without pain  The manual therapy interventions were performed at a separate and distinct time from the therapeutic activities interventions      15 min Neuromuscular Re-ed: [x]  See flow sheet   Rationale:    improve coordination, improve balance and increase proprioception to improve the patients ability to perform ADLs without pain    Billed With/As:   [x] TE   [] TA   [] Neuro   [] Self Care Patient Education: [x] Review HEP    [] Progressed/Changed HEP based on:   [] positioning   [] body mechanics   [] transfers   [] heat/ice application    [] other:        Other Objective/Functional Measures:    See PN   Post Treatment Pain Level (on 0 to 10) scale:   0  / 10     ASSESSMENT  Assessment/Changes in Function:     See PN     []  See Progress Note/Recertification   Patient will continue to benefit from skilled PT services to modify and progress therapeutic interventions, address functional mobility deficits, address ROM deficits, address strength deficits, analyze and address soft tissue restrictions and analyze and cue movement patterns to attain remaining goals.    Progress toward goals / Updated goals:    See PN     PLAN  [x]  Upgrade activities as tolerated YES Continue plan of care   []  Discharge due to :    []  Other:      Therapist: Mary Beth Guidry DPT     Date: 8/17/2021 Time: 7:00 AM        Future Appointments   Date Time Provider Evans Amaya   8/23/2021  5:15 PM Darlen Challenger [facility]   8/25/2021  6:00 PM Darlen Challenger [facility]   8/30/2021  5:15 PM Bibi Melo Pennant [facility]

## 2021-08-18 ENCOUNTER — APPOINTMENT (OUTPATIENT)
Dept: PHYSICAL THERAPY | Age: 51
End: 2021-08-18
Payer: OTHER MISCELLANEOUS

## 2021-08-23 ENCOUNTER — HOSPITAL ENCOUNTER (OUTPATIENT)
Dept: PHYSICAL THERAPY | Age: 51
Discharge: HOME | End: 2021-08-23
Payer: OTHER MISCELLANEOUS

## 2021-08-23 PROCEDURE — 97112 NEUROMUSCULAR REEDUCATION: CPT

## 2021-08-23 PROCEDURE — 97110 THERAPEUTIC EXERCISES: CPT

## 2021-08-23 PROCEDURE — 97140 MANUAL THERAPY 1/> REGIONS: CPT

## 2021-08-23 NOTE — PROGRESS NOTES
PHYSICAL THERAPY - DAILY TREATMENT NOTE    Patient Name: Abel Kawasaki        Date: 2021  : 1970    Patient  Verified: YES  Visit #:   6   of   12  Insurance: Payor: Malinda Sales / Plan: Jess Kumar COMP / Product Type: Workers Comp /      In time: 5:15 Out time: 6:25   Total Treatment Time: 79     Medicare Time Tracking (below)   Total Timed Codes (min):  - 1:1 Treatment Time:  -     TREATMENT AREA/ DIAGNOSIS = Low back pain [M54.5]  Left knee pain [M25.562]    SUBJECTIVE  Pain Level (on 0 to 10 scale):  2-3  / 10   Medication Changes/New allergies or changes in medical history, any new surgeries or procedures? NO    If yes, update Summary List   Subjective Functional Status/Changes:  []  No changes reported     Pt reports that the knee is feeling much better. She is having pain near her L groin though with lifting her leg. Also reports still having tightness on the R side of the low back.       OBJECTIVE  Modalities Rationale:     decrease inflammation and decrease pain to improve patient's ability to perform ADLs without pain   min [] Estim, type/location:                                      []  att     []  unatt     []  w/US     []  w/ice    []  w/heat    min []  Mechanical Traction: type/lbs                   []  pro   []  sup   []  int   []  cont    []  before manual    []  after manual    min []  Ultrasound, settings/location:      min []  Iontophoresis w/ dexamethasone, location:                                               []  take home patch       []  in clinic    min []  Ice     []  Heat    location/position:     min []  Vasopneumatic Device, press/temp:     min []  Other:    [x] Skin assessment post-treatment (if applicable):    [x]  intact    []  redness- no adverse reaction     []redness - adverse reaction:        30 min Therapeutic Exercise:  [x]  See flow sheet   Rationale:      increase ROM and increase strength to improve the patients ability to perform ADLs without pain     10 min Manual Therapy: STM to L/S    Rationale:      increase ROM and increase tissue extensibility to improve patient's ability to perform ADLs without pain  The manual therapy interventions were performed at a separate and distinct time from the therapeutic activities interventions    10 min Neuromuscular Re-ed: [x]  See flow sheet   Rationale:    improve coordination and increase proprioception to improve the patients ability to perform ADLs without pain    Billed With/As:   [x] TE   [] TA   [] Neuro   [] Self Care Patient Education: [x] Review HEP    [] Progressed/Changed HEP based on:   [] positioning   [] body mechanics   [] transfers   [] heat/ice application    [] other:        Other Objective/Functional Measures:    Pt still has increased tonicity to R side paraspinals. Post Treatment Pain Level (on 0 to 10) scale:   0  / 10     ASSESSMENT  Assessment/Changes in Function:     Pt showing improved overall LE activity tolerance due to decreased pain with activity. Pt showed hip flexor stretch to help alleviate some of the anterior hip pain with hip flexion     []  See Progress Note/Recertification   Patient will continue to benefit from skilled PT services to modify and progress therapeutic interventions, address functional mobility deficits, address ROM deficits, address strength deficits and analyze and cue movement patterns to attain remaining goals.    Progress toward goals / Updated goals:    Good Progress to    [] STG    [x] LTG  1 as shown by improved overall pain levels with ADLs     PLAN  [x]  Upgrade activities as tolerated YES Continue plan of care   []  Discharge due to :    []  Other:      Therapist: Toni Gregg DPT     Date: 8/23/2021 Time: 6:06 PM        Future Appointments   Date Time Provider Evans Amaya   8/25/2021  6:00 PM Cornell Sigala [facility]   8/30/2021  5:15 PM Carolina Lucero [facility]

## 2021-08-25 ENCOUNTER — HOSPITAL ENCOUNTER (OUTPATIENT)
Dept: PHYSICAL THERAPY | Age: 51
End: 2021-08-25
Payer: OTHER MISCELLANEOUS

## 2021-08-30 ENCOUNTER — APPOINTMENT (OUTPATIENT)
Dept: PHYSICAL THERAPY | Age: 51
End: 2021-08-30
Payer: OTHER MISCELLANEOUS

## 2021-09-24 NOTE — PROGRESS NOTES
[address] [facility] PHYSICAL THERAPY AT [facility] [address] [place], [address]  Phone: [phone]  Fax: [phone]  DISCHARGE NOTE  Patient Name: Gary Smith : 1970   Treatment/Medical Diagnosis: Low back pain [M54.5]  Left knee pain [M25.562]   Referral Source: SHEILA Alonso     Date of Initial Visit: 2021 Attended Visits: 5 Missed Visits: 2     SUMMARY OF TREATMENT  PT consisted of manual therapy techniques, therapeutic exercises, and modalities to improve strength, improve mobility, decrease pain, and improve overall function. CURRENT STATUS  Pt not returning to PT due to insurance issues. Info below from last progress note to get more visits approved. Pt has shown good overall improvement since onset of care. Pt demonstrates full ROM in the L knee. Pt still has increased pain after prolonged weightbearing. Pt states she has confirmed L meniscus tear and was recently seen by ortho and was told to continue PT for the L knee. Pt has some joint line tenderness. Pt also showing improved overall LBP. Pt still reports having some stiffness and soreness after prolonged working and standing. Pt still demonstrates decreased functional strength and core stability. Goal/Measure of Progress Goal Met? 1. Increase score on FOTO to > or = 60 to demo an increase in functional activity tolerance   Status at last Eval: 43 Current Status: DNA no   2. Pt will note < or = 2/10 pain with all mobility to improve comfort with ADLs. Status at last Eval: 4-8 Current Status: 1-6 no   3. Pt will demonstrate a GROC score of >/= +5 to show overall improvement in function   Status at last Eval: NA Current Status: DNA n/a     New Goals to be achieved in __6__  weeks:  1. Continue goals above   2.  -   3.  -   4.  -       RECOMMENDATIONS  Discharge from therapy due to insurance not approving more visits.  Tried multiple times to get approved for more since last progress note on 8/23/2021. Due to prolonged absence, pt to be discharged  If you have any questions/comments please contact us directly at [phone]. Thank you for allowing us to assist in the care of your patient. Therapist Signature: Eron Murillo Date: 9/24/2021     Time: 6:59 AM   NOTE TO PHYSICIAN:  PLEASE COMPLETE THE ORDERS BELOW AND FAX TO   [facility] at [address]: [phone]. If you are unable to process this request in 24 hours please contact our office: [phone].    ___ I have read the above report and request that my patient continue as recommended.   ___ I have read the above report and request that my patient continue therapy with the following changes/special instructions:_________________________________________________________   ___ I have read the above report and request that my patient be discharged from therapy.      Physician Signature:        Date:       Time:        SHEILA Foster

## 2022-02-18 PROBLEM — R87.611 PAP SMEAR OF CERVIX WITH ASCUS, CANNOT EXCLUDE HGSIL: Status: ACTIVE | Noted: 2022-02-18

## 2022-02-18 PROBLEM — N20.0 NEPHROLITHIASIS: Status: ACTIVE | Noted: 2022-02-18

## 2022-02-18 PROBLEM — S83.242A ACUTE MEDIAL MENISCUS TEAR OF LEFT KNEE: Status: ACTIVE | Noted: 2022-02-18

## 2022-02-21 ENCOUNTER — OFFICE VISIT (OUTPATIENT)
Dept: INTERNAL MEDICINE CLINIC | Age: 52
End: 2022-02-21

## 2022-02-21 VITALS
SYSTOLIC BLOOD PRESSURE: 146 MMHG | BODY MASS INDEX: 27 KG/M2 | OXYGEN SATURATION: 99 % | TEMPERATURE: 97.7 F | HEART RATE: 101 BPM | DIASTOLIC BLOOD PRESSURE: 85 MMHG | HEIGHT: 67 IN | RESPIRATION RATE: 14 BRPM | WEIGHT: 172 LBS

## 2022-02-21 DIAGNOSIS — R73.9 HYPERGLYCEMIA: ICD-10-CM

## 2022-02-21 DIAGNOSIS — Z13.220 SCREENING FOR HYPERLIPIDEMIA: ICD-10-CM

## 2022-02-21 DIAGNOSIS — R10.9 RIGHT FLANK PAIN: ICD-10-CM

## 2022-02-21 DIAGNOSIS — Z12.11 SCREENING FOR COLON CANCER: ICD-10-CM

## 2022-02-21 DIAGNOSIS — N20.0 NEPHROLITHIASIS: ICD-10-CM

## 2022-02-21 DIAGNOSIS — R92.8 ABNORMAL MAMMOGRAM: ICD-10-CM

## 2022-02-21 DIAGNOSIS — M25.562 CHRONIC PAIN OF LEFT KNEE: ICD-10-CM

## 2022-02-21 DIAGNOSIS — F41.9 ANXIETY: ICD-10-CM

## 2022-02-21 DIAGNOSIS — R10.9 RIGHT FLANK PAIN: Primary | ICD-10-CM

## 2022-02-21 DIAGNOSIS — R87.611 PAP SMEAR OF CERVIX WITH ASCUS, CANNOT EXCLUDE HGSIL: ICD-10-CM

## 2022-02-21 DIAGNOSIS — G89.29 CHRONIC PAIN OF LEFT KNEE: ICD-10-CM

## 2022-02-21 DIAGNOSIS — Z12.31 ENCOUNTER FOR SCREENING MAMMOGRAM FOR BREAST CANCER: ICD-10-CM

## 2022-02-21 PROCEDURE — 99204 OFFICE O/P NEW MOD 45 MIN: CPT | Performed by: INTERNAL MEDICINE

## 2022-02-21 RX ORDER — ESCITALOPRAM OXALATE 10 MG/1
10 TABLET ORAL DAILY
Qty: 90 TABLET | Refills: 3 | Status: SHIPPED | OUTPATIENT
Start: 2022-02-21

## 2022-02-21 NOTE — PATIENT INSTRUCTIONS
Colon Cancer Screening: Care Instructions  Overview     Colorectal cancer occurs in the colon or rectum. That's the lower part of your digestive system. It often starts in small growths called polyps in the colon or rectum. Polyps are usually found with screening tests. Depending on the type of test, any polyps found may be removed during the tests. Colorectal cancer usually does not cause symptoms at first. But regular tests can help find it early, before it spreads and becomes harder to treat. Your risk for colorectal cancer gets higher as you get older. Experts recommend starting screening at age 39 for people who are at average risk. Talk with your doctor about your risk and when to start and stop screening. You may have one of several tests. Follow-up care is a key part of your treatment and safety. Be sure to make and go to all appointments, and call your doctor if you are having problems. It's also a good idea to know your test results and keep a list of the medicines you take. What are the main screening tests for colon cancer? The screening tests are:  Stool tests. These include the guaiac fecal occult blood test (gFOBT), the fecal immunochemical test (FIT), and the combined fecal immunochemical test and stool DNA test (FIT-DNA). These tests check stool samples for signs of cancer. If your test is positive, you will need to have a colonoscopy. Sigmoidoscopy. This test lets your doctor look at the lining of your rectum and the lowest part of your colon. Your doctor uses a lighted tube called a sigmoidoscope. This test can't find cancers or polyps in the upper part of your colon. In some cases, polyps that are found can be removed. But if your doctor finds polyps, you will need to have a colonoscopy to check the upper part of your colon. Colonoscopy. This test lets your doctor look at the lining of your rectum and your entire colon. The doctor uses a thin, flexible tool called a colonoscope. It can also be used to remove polyps or get a tissue sample (biopsy). A less common test is CT colonography (CTC). It's also called virtual colonoscopy. Who should be screened for colorectal cancer? Your risk for colorectal cancer gets higher as you get older. Experts recommend starting screening at age 39 for people who are at average risk. Talk with your doctor about your risk and when to start and stop screening. How often you need screening depends on the type of test you get:  Stool tests. Every year for FIT or gFOBT. Every 1 to 3 years for sDNA, also called FIT-DNA. Tests that look inside the colon. Every 5 years for sigmoidoscopy. (If you do the FIT test every year, you can get this test every 10 years.)   Every 5 years for CT colonography (virtual colonoscopy). Every 10 years for colonoscopy. Experts agree that people at higher risk may need to be tested sooner and more often. This includes people who have a strong family history of colon cancer. Talk to your doctor about which test is best for you and when to be tested. When should you call for help? Watch closely for changes in your health, and be sure to contact your doctor if:    · You have any changes in your bowel habits.     · You have any problems. Where can you learn more? Go to http://www.gray.com/  Enter M541 in the search box to learn more about \"Colon Cancer Screening: Care Instructions. \"  Current as of: December 17, 2020               Content Version: 13.0  © 2437-9272 [employer]. Care instructions adapted under license by [employer] (which disclaims liability or warranty for this information). If you have questions about a medical condition or this instruction, always ask your healthcare professional. [place] [address] any warranty or liability for your use of this information.

## 2022-02-21 NOTE — PROGRESS NOTES
Trish Hannon presents today for   Chief Complaint   Patient presents with    New Patient     establish care         1. \"Have you been to the ER, urgent care clinic since your last visit? Hospitalized since your last visit? \" new patient    2. \"Have you seen or consulted any other health care providers outside of the [address] [place] [address] since your last visit? \" new patient     3. For patients aged 39-70: Has the patient had a colonoscopy / FIT/ Cologuard? No      If the patient is female:    4. For patients aged 41-77: Has the patient had a mammogram within the past 2 years? No  See top three    5. For patients aged 21-65: Has the patient had a pap smear?  Yes 2012

## 2022-02-21 NOTE — PROGRESS NOTES
[facility] OF [facility]:  2/21/2022, MRN: [id number]      Nydia Alatorre is a 46 y.o. female and presents to clinic as a  New Patient ([facility])      Subjective: The pt is a 48yo female with h/o ASCUS, left knee medial meniscus injury, nephrolithiasis per EHR, and stress/axiety. She works at [employer]. She declines vaccinations. 1. ASCUS Hx: Last PAP: w/i the past year by her GYN team. F/u PAPs since her abnormal PAP have been normal. She is overdue for a f/u mammogram. She has an order for this study. 2. Stress: On lexapro. She feels this rx is working. She's afraid of heights and is claustrophobic. The brand name lexapro works best for her. No depression. She did not respond wellbutrin, celexa, and effexor. +H/o panic attacks. 3. Health Maintenance:  - Tobacco use: none  - Drug use: none  - ETOH use: none    4. 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 She participated in PT and had some improvement. No left knee pain today. It no longer feels like it will give out. She saw Kristi, who recommended some kind of meniscectomy. She declined. She didn't want to miss out of work. The right sided flank pain has been there off/on for yrs. She mentions that the pain worsened after lifting a patient a year ago. No radiation of pain along her right flank. No improvement with a deep massage. No OTC rx were tried to alleviate her sx except use of a lidocaine patch but \"I felt like it wasn't getting deep enough. \" \"My urine has an odor to it. It's a little bit cloudy. \" No hematuria, dysuria, or frequency. No abdominal pain. 11/6/15 Abdomen CT: Dystrophic calcification posterior cortex upper pole right kidney possibly due to prior infectious or inflammatory process or trauma. 2 small nonobstructing calculi left kidney.  No suspicious mass seen on limited noncontrast imaging of the kidneys. Left Knee MRI 21: Partial medial meniscal root tear with displacement of the meniscus towards the gutter. Possible oblique horizontal tear of the posterior horn of the medial meniscus. Ruptured Baker's cyst. Grade III chondromalacia in the medial femoral condylar cartilage as well as in the lateral retropatellar cartilage with a fissure extending into the median ridge. Patient Active Problem List    Diagnosis Date Noted    Anxiety 2022    Abnormal mammogram 2022    Pap smear of cervix with ASCUS, cannot exclude HGSIL 2022    Acute medial meniscus tear of left knee 2022    Nephrolithiasis 2022       Current Outpatient Medications   Medication Sig Dispense Refill    escitalopram oxalate (Lexapro) 10 mg tablet Take 1 Tablet by mouth daily. BRAND NAME ONLY 90 Tablet 3    ibuprofen (MOTRIN IB) 200 mg tablet Take  by mouth.  (Patient not taking: Reported on 2022)         Allergies   Allergen Reactions    Epinephrine Anaphylaxis    Celexa [Citalopram] Other (comments)    Effexor [Venlafaxine] Other (comments)    Egg Hives     Per pt    Iodinated Contrast Media Hives    Penicillamine Hives    Prednisone Anxiety    Sulfa (Sulfonamide Antibiotics) Unknown (comments)    Wellbutrin [Bupropion] Other (comments)       Past Medical History:   Diagnosis Date    Bell's palsy     Depression        Past Surgical History:   Procedure Laterality Date    HX GYN       x 3    HX GYN      cerclage       Family History   Problem Relation Age of Onset    COPD Mother     Diabetes Mother     Hypertension Mother     Cancer Father         Metastatic Lung Cancer    Alcohol abuse Father     Thyroid Disease Father     Diabetes Sister     Cancer Brother         Prostate Cancer    Diabetes Sister     Hypertension Sister        Social History     Tobacco Use    Smoking status: Never Smoker    Smokeless tobacco: Never Used   Substance Use Topics    Alcohol use: No       ROS   Review of Systems   Constitutional: Negative for chills and fever. HENT: Negative for ear pain and sore throat. Eyes: Negative for blurred vision and pain. Respiratory: Negative for cough and shortness of breath. Cardiovascular: Negative for chest pain. Gastrointestinal: Negative for abdominal pain, blood in stool and melena. Genitourinary: Negative for dysuria and hematuria. Musculoskeletal: Positive for back pain and joint pain. Negative for myalgias. Skin: Negative for rash. Neurological: Negative for headaches. Endo/Heme/Allergies: Does not bruise/bleed easily. Psychiatric/Behavioral: Negative for depression and substance abuse. The patient is nervous/anxious. Objective     Vitals:    02/21/22 0949 02/21/22 1003   BP: (!) 148/82 (!) 146/85   Pulse: (!) 101    Resp: 14    Temp: 97.7 °F (36.5 °C)    TempSrc: Temporal    SpO2: 99%    Weight: 172 lb (78 kg)    Height: 5' 6.75\" (1.695 m)    PainSc:   1    PainLoc: Back    LMP: 02/14/2022       Physical Exam  Vitals and nursing note reviewed. HENT:      Head: Normocephalic and atraumatic. Right Ear: External ear normal.      Left Ear: External ear normal.   Eyes:      General: No scleral icterus. Right eye: No discharge. Left eye: No discharge. Conjunctiva/sclera: Conjunctivae normal.   Cardiovascular:      Rate and Rhythm: Normal rate and regular rhythm. Heart sounds: Normal heart sounds. No murmur heard. No friction rub. No gallop. Pulmonary:      Effort: Pulmonary effort is normal. No respiratory distress. Breath sounds: Normal breath sounds. No wheezing or rales. Chest:      Chest wall: No tenderness. Abdominal:      General: Bowel sounds are normal. There is no distension. Palpations: Abdomen is soft. There is no mass. Tenderness: There is no abdominal tenderness. There is no guarding or rebound.    Musculoskeletal:         General: No swelling (BUE) or tenderness (BUE). Cervical back: Neck supple. Comments: She has TTP along her right lumbar paraspinal muscles. The remainder of her back exam is unremarkable. Her right knee and left knee have crepitus with flexion/extension exercises. Negative b/l Shelbie test.    Lymphadenopathy:      Cervical: No cervical adenopathy. Skin:     General: Skin is warm and dry. Findings: No erythema or rash. Neurological:      Mental Status: She is alert. Motor: No abnormal muscle tone. Gait: Gait normal.   Psychiatric:         Mood and Affect: Mood normal.           Assessment/Plan:   1. H/o Abnormal Mammogram:   - Ordering a mammogram and ultrasound  Requesting her previous mammogram and ultrasound study records. ORDERS:  - US BREASTS LIMITED=<3 QUAD; Future  - DELMI 3D TYESHA W MAMMO BI DX INCL CAD; Future    2. Pap smear of cervix with ASCUS, cannot exclude HGSIL: F/u PAPs were WNL per her hx  Requesting her last Pap. 3. Health Maintenance:  Checking for high cholesterol with a lipid panel. Ordering a Cologuard test.  We'll screen for diabetes with an A1c test as she had a mildly elevated blood sugar noted on a previous BMP per review of the EHR. ORDERS:  - LIPID PANEL; Future  - METABOLIC PANEL, COMPREHENSIVE; Future  - HEMOGLOBIN A1C WITH EAG; Future  - COLOGUARD TEST (FECAL DNA COLORECTAL CANCER SCREENING)    4. Right flank pain: Per PE findings, the symptoms are likely from muscle strain. Checking labs and a retroperitoneal ultrasound for completeness. I offered to order a muscle relaxant but she declined. She will try massage and warm compresses. We also discussed topical medication she can use over-the-counter    ORDERS:  - METABOLIC PANEL, COMPREHENSIVE; Future  - CBC WITH AUTOMATED DIFF; Future  - URINALYSIS W/MICROSCOPIC; Future  - US RETROPERITONEUM COMP; Future    5. Chronic pain of left knee:   Activity as tolerated.   I encouraged her to schedule an appointment with PT  -Requesting her Orthopedic records. 6. Anxiety:   Refilling her Lexapro for a year. ORDERS:  - escitalopram oxalate (Lexapro) 10 mg tablet; Take 1 Tablet by mouth daily. BRAND NAME ONLY  Dispense: 90 Tablet; Refill: 3      Health Maintenance Due   Topic Date Due    Hepatitis C Screening  Never done    COVID-19 Vaccine (1) Never done    Lipid Screen  Never done    Colorectal Cancer Screening Combo  Never done    Cervical cancer screen  08/31/2015    Shingrix Vaccine Age 50> (1 of 2) Never done    Breast Cancer Screen Mammogram  Never done    DTaP/Tdap/Td series (2 - Td or Tdap) 07/10/2020       Lab review: labs ordered as mentioned above and ordered mention above. I have discussed the diagnosis with the patient and the intended plan as seen in the above orders. The patient has received an after-visit summary and questions were answered concerning future plans. I have discussed medication side effects and warnings with the patient as well. I have reviewed the plan of care with the patient, accepted their input and they are in agreement with the treatment goals. All questions were answered. The patient understands the plan of care. Handouts provided today with above information. Pt instructed if symptoms worsen to call the office or report to the ED for continued care. Greater than 50% of the visit time was spent in counseling and/or coordination of care. Voice recognition was used to generate this report, which may have resulted in some phonetic based errors in grammar and contents. Even though attempts were made to correct all the mistakes, some may have been missed, and remained in the body of the document. Follow-up and Dispositions    · Return in about 6 weeks (around 4/4/2022).          Cele Snyder MD

## 2022-03-18 PROBLEM — R87.611 PAP SMEAR OF CERVIX WITH ASCUS, CANNOT EXCLUDE HGSIL: Status: ACTIVE | Noted: 2022-02-18

## 2022-03-18 PROBLEM — R92.8 ABNORMAL MAMMOGRAM: Status: ACTIVE | Noted: 2022-02-21

## 2022-03-19 PROBLEM — N20.0 NEPHROLITHIASIS: Status: ACTIVE | Noted: 2022-02-18

## 2022-03-19 PROBLEM — S83.242A ACUTE MEDIAL MENISCUS TEAR OF LEFT KNEE: Status: ACTIVE | Noted: 2022-02-18

## 2022-03-19 PROBLEM — F41.9 ANXIETY: Status: ACTIVE | Noted: 2022-02-21

## 2023-04-15 SDOH — ECONOMIC STABILITY: FOOD INSECURITY: WITHIN THE PAST 12 MONTHS, THE FOOD YOU BOUGHT JUST DIDN'T LAST AND YOU DIDN'T HAVE MONEY TO GET MORE.: NEVER TRUE

## 2023-04-15 SDOH — ECONOMIC STABILITY: INCOME INSECURITY: HOW HARD IS IT FOR YOU TO PAY FOR THE VERY BASICS LIKE FOOD, HOUSING, MEDICAL CARE, AND HEATING?: NOT HARD AT ALL

## 2023-04-15 SDOH — ECONOMIC STABILITY: HOUSING INSECURITY
IN THE LAST 12 MONTHS, WAS THERE A TIME WHEN YOU DID NOT HAVE A STEADY PLACE TO SLEEP OR SLEPT IN A SHELTER (INCLUDING NOW)?: NO

## 2023-04-15 SDOH — ECONOMIC STABILITY: TRANSPORTATION INSECURITY
IN THE PAST 12 MONTHS, HAS LACK OF TRANSPORTATION KEPT YOU FROM MEETINGS, WORK, OR FROM GETTING THINGS NEEDED FOR DAILY LIVING?: NO

## 2023-04-15 SDOH — ECONOMIC STABILITY: FOOD INSECURITY: WITHIN THE PAST 12 MONTHS, YOU WORRIED THAT YOUR FOOD WOULD RUN OUT BEFORE YOU GOT MONEY TO BUY MORE.: NEVER TRUE

## 2023-04-18 ENCOUNTER — OFFICE VISIT (OUTPATIENT)
Age: 53
End: 2023-04-18
Payer: COMMERCIAL

## 2023-04-18 VITALS
SYSTOLIC BLOOD PRESSURE: 133 MMHG | HEART RATE: 87 BPM | BODY MASS INDEX: 27.31 KG/M2 | DIASTOLIC BLOOD PRESSURE: 85 MMHG | HEIGHT: 67 IN | WEIGHT: 174 LBS | RESPIRATION RATE: 14 BRPM | TEMPERATURE: 98.4 F | OXYGEN SATURATION: 97 %

## 2023-04-18 DIAGNOSIS — F41.9 ANXIETY: Primary | ICD-10-CM

## 2023-04-18 DIAGNOSIS — L98.9 SKIN LESION: ICD-10-CM

## 2023-04-18 DIAGNOSIS — Z12.31 ENCOUNTER FOR SCREENING MAMMOGRAM FOR BREAST CANCER: ICD-10-CM

## 2023-04-18 DIAGNOSIS — Z82.49 FAMILY HISTORY OF CORONARY ARTERY DISEASE: ICD-10-CM

## 2023-04-18 DIAGNOSIS — R10.9 RIGHT FLANK PAIN: ICD-10-CM

## 2023-04-18 DIAGNOSIS — R92.8 ABNORMAL MAMMOGRAM: ICD-10-CM

## 2023-04-18 DIAGNOSIS — Z11.59 NEED FOR HEPATITIS C SCREENING TEST: ICD-10-CM

## 2023-04-18 DIAGNOSIS — Z11.4 ENCOUNTER FOR HUMAN IMMUNODEFICIENCY VIRUS TEST: ICD-10-CM

## 2023-04-18 DIAGNOSIS — Z13.220 SCREENING FOR HYPERLIPIDEMIA: ICD-10-CM

## 2023-04-18 DIAGNOSIS — Z12.11 ENCOUNTER FOR SCREENING FOR MALIGNANT NEOPLASM OF COLON: ICD-10-CM

## 2023-04-18 DIAGNOSIS — N20.0 NEPHROLITHIASIS: ICD-10-CM

## 2023-04-18 PROCEDURE — 99214 OFFICE O/P EST MOD 30 MIN: CPT | Performed by: INTERNAL MEDICINE

## 2023-04-18 RX ORDER — DIAZEPAM 2 MG/1
2 TABLET ORAL EVERY 6 HOURS PRN
Qty: 10 TABLET | Refills: 0 | Status: SHIPPED | OUTPATIENT
Start: 2023-04-18 | End: 2023-04-28

## 2023-04-18 RX ORDER — ESCITALOPRAM OXALATE 10 MG/1
10 TABLET ORAL DAILY
Qty: 90 TABLET | Refills: 3 | Status: SHIPPED | OUTPATIENT
Start: 2023-04-18

## 2023-04-18 RX ORDER — TIZANIDINE 2 MG/1
2 TABLET ORAL EVERY 8 HOURS PRN
Qty: 10 TABLET | Refills: 0 | Status: SHIPPED | OUTPATIENT
Start: 2023-04-18

## 2023-04-18 ASSESSMENT — PATIENT HEALTH QUESTIONNAIRE - PHQ9
10. IF YOU CHECKED OFF ANY PROBLEMS, HOW DIFFICULT HAVE THESE PROBLEMS MADE IT FOR YOU TO DO YOUR WORK, TAKE CARE OF THINGS AT HOME, OR GET ALONG WITH OTHER PEOPLE: 0
6. FEELING BAD ABOUT YOURSELF - OR THAT YOU ARE A FAILURE OR HAVE LET YOURSELF OR YOUR FAMILY DOWN: 0
SUM OF ALL RESPONSES TO PHQ QUESTIONS 1-9: 2
2. FEELING DOWN, DEPRESSED OR HOPELESS: 0
7. TROUBLE CONCENTRATING ON THINGS, SUCH AS READING THE NEWSPAPER OR WATCHING TELEVISION: 1
4. FEELING TIRED OR HAVING LITTLE ENERGY: 1
1. LITTLE INTEREST OR PLEASURE IN DOING THINGS: 0
5. POOR APPETITE OR OVEREATING: 0
8. MOVING OR SPEAKING SO SLOWLY THAT OTHER PEOPLE COULD HAVE NOTICED. OR THE OPPOSITE, BEING SO FIGETY OR RESTLESS THAT YOU HAVE BEEN MOVING AROUND A LOT MORE THAN USUAL: 0
SUM OF ALL RESPONSES TO PHQ QUESTIONS 1-9: 2
3. TROUBLE FALLING OR STAYING ASLEEP: 0
SUM OF ALL RESPONSES TO PHQ QUESTIONS 1-9: 2
SUM OF ALL RESPONSES TO PHQ9 QUESTIONS 1 & 2: 0
SUM OF ALL RESPONSES TO PHQ QUESTIONS 1-9: 2
9. THOUGHTS THAT YOU WOULD BE BETTER OFF DEAD, OR OF HURTING YOURSELF: 0

## 2023-04-20 ENCOUNTER — TELEPHONE (OUTPATIENT)
Age: 53
End: 2023-04-20

## 2023-04-20 DIAGNOSIS — R92.8 ABNORMAL MAMMOGRAM: Primary | ICD-10-CM

## 2023-04-20 NOTE — TELEPHONE ENCOUNTER
[facility] and states that there is an order for a breast ultrasound complete but it needs to be a breast ultrasound limited. She is requesting if it can be changed?     Please advise, thank you

## 2023-04-21 NOTE — TELEPHONE ENCOUNTER
Please let [facility] know that I added the limited breast ultrasound per their message. Please have pt scheduled for her ultrasound.     Dr. Belgica Quintero  [facility], [address] [place] [address], [address] [place] [address] Str.  Phone: [phone]  Fax: [phone]

## 2023-04-24 ENCOUNTER — HOSPITAL ENCOUNTER (OUTPATIENT)
Facility: HOSPITAL | Age: 53
Discharge: HOME | End: 2023-04-27

## 2023-04-24 DIAGNOSIS — Z13.220 SCREENING FOR HYPERLIPIDEMIA: ICD-10-CM

## 2023-04-24 DIAGNOSIS — Z82.49 FAMILY HISTORY OF CORONARY ARTERY DISEASE: ICD-10-CM

## 2023-04-24 PROCEDURE — 75571 CT HRT W/O DYE W/CA TEST: CPT

## 2023-04-24 ASSESSMENT — ENCOUNTER SYMPTOMS
BLOOD IN STOOL: 0
ABDOMINAL PAIN: 1
SHORTNESS OF BREATH: 0
ANAL BLEEDING: 0
SORE THROAT: 0
EYE PAIN: 0
COUGH: 0

## 2023-05-01 ENCOUNTER — TELEPHONE (OUTPATIENT)
Age: 53
End: 2023-05-01

## 2023-05-01 RX ORDER — BACLOFEN 10 MG/1
10 TABLET ORAL 3 TIMES DAILY PRN
Qty: 30 TABLET | Refills: 3 | Status: SHIPPED | OUTPATIENT
Start: 2023-05-01

## 2023-05-01 NOTE — TELEPHONE ENCOUNTER
Please let her know that I am ordering baclofen per her insurance formulary in place of tizanidine.     Dr. Schafer Stands  [facility], [address] [place] [address], [facility]  Phone: [phone]  Fax: [phone]

## 2023-05-01 NOTE — TELEPHONE ENCOUNTER
Received notification from pharmacy/insurance tizanidine 2 mg is not covered. The preferred alternative is baclofen. Please send if appropriate.

## 2023-05-11 ENCOUNTER — HOSPITAL ENCOUNTER (OUTPATIENT)
Facility: HOSPITAL | Age: 53
End: 2023-05-11
Payer: COMMERCIAL

## 2023-05-11 ENCOUNTER — HOSPITAL ENCOUNTER (OUTPATIENT)
Facility: HOSPITAL | Age: 53
Discharge: HOME | End: 2023-05-11
Payer: COMMERCIAL

## 2023-05-11 DIAGNOSIS — R10.9 RIGHT FLANK PAIN: ICD-10-CM

## 2023-05-11 DIAGNOSIS — R92.8 ABNORMAL MAMMOGRAM: ICD-10-CM

## 2023-05-11 DIAGNOSIS — N20.0 NEPHROLITHIASIS: ICD-10-CM

## 2023-05-11 PROCEDURE — 76770 US EXAM ABDO BACK WALL COMP: CPT

## 2023-05-11 PROCEDURE — G0279 TOMOSYNTHESIS, MAMMO: HCPCS

## 2023-05-11 PROCEDURE — 76642 ULTRASOUND BREAST LIMITED: CPT

## 2023-05-11 PROCEDURE — 77063 BREAST TOMOSYNTHESIS BI: CPT

## 2023-05-30 DIAGNOSIS — R92.8 ABNORMAL MAMMOGRAM OF BOTH BREASTS: Primary | ICD-10-CM

## 2023-05-31 ENCOUNTER — TELEPHONE (OUTPATIENT)
Age: 53
End: 2023-05-31

## 2023-05-31 NOTE — TELEPHONE ENCOUNTER
----- Message from Carrie Crawford MD sent at 5/30/2023  1:07 AM EDT -----  Please let the patient know that the radiologist is recommending a follow-up mammogram and ultrasound of the left breast in 6 months due to cysts noted on her recent mammogram study. I am placing orders for the studies to be done in November.     Dr. Carrie Crawford  Internists of [address]  Phone: [phone]  Fax: [phone]

## 2024-01-08 ENCOUNTER — OFFICE VISIT (OUTPATIENT)
Age: 54
End: 2024-01-08
Payer: COMMERCIAL

## 2024-01-08 VITALS
WEIGHT: 179.8 LBS | OXYGEN SATURATION: 97 % | HEIGHT: 66 IN | RESPIRATION RATE: 18 BRPM | BODY MASS INDEX: 28.9 KG/M2 | DIASTOLIC BLOOD PRESSURE: 74 MMHG | TEMPERATURE: 98.2 F | SYSTOLIC BLOOD PRESSURE: 128 MMHG | HEART RATE: 89 BPM

## 2024-01-08 DIAGNOSIS — M62.08 DIASTASIS RECTI: ICD-10-CM

## 2024-01-08 DIAGNOSIS — R82.90 ABNORMAL URINALYSIS: ICD-10-CM

## 2024-01-08 DIAGNOSIS — R92.8 ABNORMAL MAMMOGRAM: ICD-10-CM

## 2024-01-08 DIAGNOSIS — R10.9 RIGHT FLANK PAIN: Primary | ICD-10-CM

## 2024-01-08 DIAGNOSIS — F41.9 ANXIETY: ICD-10-CM

## 2024-01-08 DIAGNOSIS — Z12.11 ENCOUNTER FOR SCREENING FOR MALIGNANT NEOPLASM OF COLON: ICD-10-CM

## 2024-01-08 LAB
BASOPHILS # BLD AUTO: 0 X10E3/UL (ref 0–0.2)
BASOPHILS NFR BLD AUTO: 0 %
EOSINOPHIL # BLD AUTO: 0.2 X10E3/UL (ref 0–0.4)
EOSINOPHIL NFR BLD AUTO: 3 %
ERYTHROCYTE [DISTWIDTH] IN BLOOD BY AUTOMATED COUNT: 12.1 % (ref 11.7–15.4)
HCT VFR BLD AUTO: 44.9 % (ref 34–46.6)
HGB BLD-MCNC: 15 G/DL (ref 11.1–15.9)
IMM GRANULOCYTES # BLD AUTO: 0 X10E3/UL (ref 0–0.1)
IMM GRANULOCYTES NFR BLD AUTO: 0 %
LYMPHOCYTES # BLD AUTO: 2.1 X10E3/UL (ref 0.7–3.1)
LYMPHOCYTES NFR BLD AUTO: 31 %
MCH RBC QN AUTO: 29 PG (ref 26.6–33)
MCHC RBC AUTO-ENTMCNC: 33.4 G/DL (ref 31.5–35.7)
MCV RBC AUTO: 87 FL (ref 79–97)
MONOCYTES # BLD AUTO: 0.5 X10E3/UL (ref 0.1–0.9)
MONOCYTES NFR BLD AUTO: 8 %
NEUTROPHILS # BLD AUTO: 3.9 X10E3/UL (ref 1.4–7)
NEUTROPHILS NFR BLD AUTO: 58 %
PLATELET # BLD AUTO: 274 X10E3/UL (ref 150–450)
RBC # BLD AUTO: 5.18 X10E6/UL (ref 3.77–5.28)
WBC # BLD AUTO: 6.8 X10E3/UL (ref 3.4–10.8)

## 2024-01-08 PROCEDURE — 99214 OFFICE O/P EST MOD 30 MIN: CPT | Performed by: INTERNAL MEDICINE

## 2024-01-08 RX ORDER — ESCITALOPRAM OXALATE 10 MG/1
20 TABLET ORAL DAILY
Qty: 180 TABLET | Refills: 1 | Status: SHIPPED | OUTPATIENT
Start: 2024-01-08

## 2024-01-08 RX ORDER — ALPRAZOLAM 0.25 MG/1
0.25 TABLET ORAL 3 TIMES DAILY PRN
Qty: 3 TABLET | Refills: 0 | Status: SHIPPED | OUTPATIENT
Start: 2024-01-08 | End: 2024-02-07

## 2024-01-08 RX ORDER — TIZANIDINE 2 MG/1
2 TABLET ORAL EVERY 8 HOURS PRN
Qty: 10 TABLET | Refills: 0 | Status: SHIPPED | OUTPATIENT
Start: 2024-01-08

## 2024-01-08 ASSESSMENT — PATIENT HEALTH QUESTIONNAIRE - PHQ9
SUM OF ALL RESPONSES TO PHQ9 QUESTIONS 1 & 2: 0
2. FEELING DOWN, DEPRESSED OR HOPELESS: 0
SUM OF ALL RESPONSES TO PHQ QUESTIONS 1-9: 0
SUM OF ALL RESPONSES TO PHQ QUESTIONS 1-9: 0
1. LITTLE INTEREST OR PLEASURE IN DOING THINGS: 0
SUM OF ALL RESPONSES TO PHQ QUESTIONS 1-9: 0
SUM OF ALL RESPONSES TO PHQ QUESTIONS 1-9: 0

## 2024-01-08 NOTE — PROGRESS NOTES
INTERNISTS OF [facility]:  1/9/2024, MRN: [id number]      Cha Khan is a 53 y.o. female and presents to clinic for Medication Refill and Other (Demetrice statis recti)      Subjective:   The pt is a 52yo female with h/o ASCUS, left knee medial meniscus injury, nephrolithiasis per EHR, and stress/axiety. She works at [employer]. She declines vaccinations.      1. Right back pain: Responsive to muscle relaxants but they may her super drowsy. Pain is off/on. No h/o trauma since her last apt. No paresthesias.    2. Abnormal Mammogram Hx: She is scheduled tomorrow for her mammogram and ultrasound.     PAP: *My pap smear was good.\" She is utd but we don't have records.     Mammogram 5/11/23: Complicated cysts bilaterally. Follow-up ultrasound of both breasts in 6 months is recommended. BI-RADS Category 3 - Probably Benign    3. Abdominal Swelling:  She has diastasis recti. She noticed it while exercising. \"It's not on pain. It's a discomfort.\"  It prevents her from doing sit ups.  An ultrasound tech at her job did any exam and said she had diastasis recti.  No depression. No ETOH. No drug use.     4. Anxiety: She's about to go on a cruise. She has claustrophobia.  She takes 2mg of valium prn anxiety spells. She is on lexapro 10mg daily. No depression. She tried xanax       Patient Active Problem List    Diagnosis Date Noted   • Abnormal mammogram 02/21/2022   • Anxiety 02/21/2022   • Pap smear of cervix with ASCUS, cannot exclude HGSIL 02/18/2022   • Acute medial meniscus tear of left knee 02/18/2022   • Nephrolithiasis 02/18/2022       Current Outpatient Medications   Medication Sig Dispense Refill   • ciprofloxacin (CIPRO) 250 MG tablet Take 1 tablet by mouth 2 times daily for 3 days 6 tablet 0   • ALPRAZolam (XANAX) 0.25 MG tablet Take 1 tablet by mouth 3 times daily as needed for Anxiety for up to 30 days. Max Daily Amount: 0.75 mg 3 tablet 0   • tiZANidine (ZANAFLEX) 2 MG tablet Take 1 tablet by mouth every 8 hours

## 2024-01-08 NOTE — PROGRESS NOTES
Cha Khan presents today for   Chief Complaint   Patient presents with    Medication Refill    Other     Demetrice statis recti                 1. \"Have you been to the ER, urgent care clinic since your last visit?  Hospitalized since your last visit?\" no    2. \"Have you seen or consulted any other health care providers outside of the [facility] since your last visit?\" no     3. For patients aged 45-75: Has the patient had a colonoscopy / FIT/ Cologuard? No      If the patient is female:    4. For patients aged 40-74: Has the patient had a mammogram within the past 2 years? Yes - no Care Gap present      5. For patients aged 21-65: Has the patient had a pap smear? Yes - no Care Gap present

## 2024-01-09 ENCOUNTER — TELEPHONE (OUTPATIENT)
Age: 54
End: 2024-01-09

## 2024-01-09 ENCOUNTER — HOSPITAL ENCOUNTER (OUTPATIENT)
Facility: HOSPITAL | Age: 54
Discharge: HOME | End: 2024-01-12
Attending: INTERNAL MEDICINE
Payer: COMMERCIAL

## 2024-01-09 ENCOUNTER — HOSPITAL ENCOUNTER (OUTPATIENT)
Dept: WOMENS IMAGING | Facility: HOSPITAL | Age: 54
Discharge: HOME | End: 2024-01-12
Attending: INTERNAL MEDICINE
Payer: COMMERCIAL

## 2024-01-09 DIAGNOSIS — R92.8 ABNORMAL MAMMOGRAM OF BOTH BREASTS: ICD-10-CM

## 2024-01-09 DIAGNOSIS — F41.9 ANXIETY: ICD-10-CM

## 2024-01-09 LAB
ALBUMIN SERPL-MCNC: 4.4 G/DL (ref 3.8–4.9)
ALBUMIN/GLOB SERPL: 1.4 {RATIO} (ref 1.2–2.2)
ALP SERPL-CCNC: 97 IU/L (ref 44–121)
ALT SERPL-CCNC: 20 IU/L (ref 0–32)
APPEARANCE UR: CLEAR
AST SERPL-CCNC: 18 IU/L (ref 0–40)
BACTERIA #/AREA URNS HPF: ABNORMAL /[HPF]
BILIRUB SERPL-MCNC: 0.2 MG/DL (ref 0–1.2)
BILIRUB UR QL STRIP: NEGATIVE
BUN SERPL-MCNC: 12 MG/DL (ref 6–24)
BUN/CREAT SERPL: 17 (ref 9–23)
CALCIUM SERPL-MCNC: 9.3 MG/DL (ref 8.7–10.2)
CASTS URNS QL MICRO: ABNORMAL /LPF
CHLORIDE SERPL-SCNC: 103 MMOL/L (ref 96–106)
CHOLEST SERPL-MCNC: 153 MG/DL (ref 100–199)
CO2 SERPL-SCNC: 26 MMOL/L (ref 20–29)
COLOR UR: YELLOW
CREAT SERPL-MCNC: 0.71 MG/DL (ref 0.57–1)
EGFRCR SERPLBLD CKD-EPI 2021: 100 ML/MIN/1.73
EPI CELLS #/AREA URNS HPF: ABNORMAL /HPF (ref 0–10)
GLOBULIN SER CALC-MCNC: 3.1 G/DL (ref 1.5–4.5)
GLUCOSE SERPL-MCNC: 85 MG/DL (ref 70–99)
GLUCOSE UR QL STRIP: NEGATIVE
HCV AB SERPL QL IA: NORMAL
HCV IGG SERPL QL IA: NON REACTIVE
HDLC SERPL-MCNC: 45 MG/DL
HGB UR QL STRIP: NEGATIVE
HIV 1+2 AB+HIV1 P24 AG SERPL QL IA: NON REACTIVE
KETONES UR QL STRIP: NEGATIVE
LDLC SERPL CALC-MCNC: 93 MG/DL (ref 0–99)
LEUKOCYTE ESTERASE UR QL STRIP: ABNORMAL
MICRO URNS: ABNORMAL
NITRITE UR QL STRIP: POSITIVE
PH UR STRIP: 5.5 [PH] (ref 5–7.5)
POTASSIUM SERPL-SCNC: 4.4 MMOL/L (ref 3.5–5.2)
PROT SERPL-MCNC: 7.5 G/DL (ref 6–8.5)
PROT UR QL STRIP: NEGATIVE
RBC #/AREA URNS HPF: ABNORMAL /HPF (ref 0–2)
SODIUM SERPL-SCNC: 142 MMOL/L (ref 134–144)
SP GR UR STRIP: 1.02 (ref 1–1.03)
TRIGL SERPL-MCNC: 79 MG/DL (ref 0–149)
UROBILINOGEN UR STRIP-MCNC: 0.2 MG/DL (ref 0.2–1)
VLDLC SERPL CALC-MCNC: 15 MG/DL (ref 5–40)
WBC #/AREA URNS HPF: ABNORMAL /HPF (ref 0–5)

## 2024-01-09 PROCEDURE — 76642 ULTRASOUND BREAST LIMITED: CPT

## 2024-01-09 RX ORDER — CIPROFLOXACIN 250 MG/1
250 TABLET, FILM COATED ORAL 2 TIMES DAILY
Qty: 6 TABLET | Refills: 0 | Status: SHIPPED | OUTPATIENT
Start: 2024-01-09 | End: 2024-01-12

## 2024-01-09 ASSESSMENT — ENCOUNTER SYMPTOMS
EYE PAIN: 0
ANAL BLEEDING: 0
SHORTNESS OF BREATH: 0
COUGH: 0
BACK PAIN: 1
SORE THROAT: 0
ABDOMINAL PAIN: 0
ABDOMINAL DISTENTION: 1
BLOOD IN STOOL: 0

## 2024-01-09 NOTE — TELEPHONE ENCOUNTER
----- Message from Lexy Ashford MD sent at 1/9/2024  8:30 AM EST -----  Please let her know that her urinalysis reveals suggest that she has underlying UTI.  I am treating her with a course of antibiotics.  Meanwhile, her kidney and liver function labs are unremarkable.  Her lipid panel is normal.  Her blood counts are normal.    Dr. Lexy Ashford  Internists of [address], [address]  [place] [address]  Phone: [phone]  Fax: [phone]

## 2024-01-09 NOTE — PROGRESS NOTES
Her UA results suggest a UTI. Abx ordered. Urine cx ordered.    Dr. Lexy Ashford  Internists of [address], [address]  [place] [address]  Phone: [phone]  Fax: [phone]

## 2024-01-19 ENCOUNTER — TELEPHONE (OUTPATIENT)
Age: 54
End: 2024-01-19

## 2024-04-23 SDOH — ECONOMIC STABILITY: FOOD INSECURITY: WITHIN THE PAST 12 MONTHS, THE FOOD YOU BOUGHT JUST DIDN'T LAST AND YOU DIDN'T HAVE MONEY TO GET MORE.: NEVER TRUE

## 2024-04-23 SDOH — ECONOMIC STABILITY: FOOD INSECURITY: WITHIN THE PAST 12 MONTHS, YOU WORRIED THAT YOUR FOOD WOULD RUN OUT BEFORE YOU GOT MONEY TO BUY MORE.: NEVER TRUE

## 2024-04-23 SDOH — ECONOMIC STABILITY: INCOME INSECURITY: HOW HARD IS IT FOR YOU TO PAY FOR THE VERY BASICS LIKE FOOD, HOUSING, MEDICAL CARE, AND HEATING?: NOT HARD AT ALL

## 2024-04-25 ENCOUNTER — OFFICE VISIT (OUTPATIENT)
Age: 54
End: 2024-04-25
Payer: COMMERCIAL

## 2024-04-25 VITALS
WEIGHT: 182.8 LBS | DIASTOLIC BLOOD PRESSURE: 77 MMHG | OXYGEN SATURATION: 100 % | TEMPERATURE: 97.7 F | RESPIRATION RATE: 18 BRPM | BODY MASS INDEX: 29.38 KG/M2 | HEIGHT: 66 IN | HEART RATE: 74 BPM | SYSTOLIC BLOOD PRESSURE: 121 MMHG

## 2024-04-25 DIAGNOSIS — R82.90 ABNORMAL URINE: ICD-10-CM

## 2024-04-25 DIAGNOSIS — I83.91 SPIDER VEIN OF RIGHT LOWER EXTREMITY: Primary | ICD-10-CM

## 2024-04-25 PROCEDURE — 99213 OFFICE O/P EST LOW 20 MIN: CPT | Performed by: INTERNAL MEDICINE

## 2024-04-25 ASSESSMENT — ENCOUNTER SYMPTOMS
BLOOD IN STOOL: 0
SHORTNESS OF BREATH: 0
COUGH: 0
EYE PAIN: 0
SORE THROAT: 0
ABDOMINAL PAIN: 0
ANAL BLEEDING: 0

## 2024-04-25 NOTE — PROGRESS NOTES
INTERNISTS OF [facility]:  4/25/2024, MRN: [id number]      Cha Khan is a 54 y.o. female and presents to clinic for Leg Pain (Veins)      Subjective:   The pt is a 53yo female with h/o ASCUS, left knee medial meniscus injury, nephrolithiasis per EHR, and stress/anxiety(situational). She works at [employer]. She declines vaccinations.      1.  RLE Varicosities: Present: present for >3 months but worsening over the past 2-3 wks. Paresthesias: none. Myalgias: none. Alleviating factors: massaging the area. Only along areas that she has \"spider veins.\" She has worsening spider veins per her hx.  No changes to her breathing since her last apt. LLE swells up off/on more than her RLE. No spider veins on her LLE.    2. Abnormal Urine: She does not believe she got over her UTI due to having the following symptoms. She has foul odor and it is cloudy. She states she has tea colored urine.  No hematuria or dysuria.    3. Health Maintenance:  4/11/24 Lab results (via employee physical)  Tc 139    A1C 5.3%      Patient Active Problem List    Diagnosis Date Noted    Abnormal mammogram 02/21/2022    Anxiety 02/21/2022    Pap smear of cervix with ASCUS, cannot exclude HGSIL 02/18/2022    Acute medial meniscus tear of left knee 02/18/2022    Nephrolithiasis 02/18/2022       Current Outpatient Medications   Medication Sig Dispense Refill    diclofenac sodium (VOLTAREN) 1 % GEL Apply 4 g topically 4 times daily 350 g 1    tiZANidine (ZANAFLEX) 2 MG tablet Take 1 tablet by mouth every 8 hours as needed (muscle spasm along the right side of the back) 10 tablet 0    escitalopram (LEXAPRO) 10 MG tablet Take 2 tablets by mouth daily 180 tablet 1     No current facility-administered medications for this visit.       Allergies   Allergen Reactions    Epinephrine Anaphylaxis    Bupropion Other (See Comments)    Citalopram Other (See Comments)    Egg White (Egg Protein) Hives     Per pt    Iodinated Contrast Media Hives

## 2024-04-25 NOTE — PROGRESS NOTES
Cha Khan presents today for   Chief Complaint   Patient presents with    Leg Pain     Veins           \"Have you been to the ER, urgent care clinic since your last visit?  Hospitalized since your last visit?\"    NO    “Have you seen or consulted any other health care providers outside of [facility] since your last visit?”    NO    “Have you had a colorectal cancer screening such as a colonoscopy/FIT/Cologuard?    NO    No colonoscopy on file  No cologuard on file  No FIT/FOBT on file   No flexible sigmoidoscopy on file         “Have you had a pap smear?”    NO-UTD    Date of last Cervical Cancer screen (HPV or PAP): 9/18/2015

## 2024-05-06 ENCOUNTER — TELEPHONE (OUTPATIENT)
Age: 54
End: 2024-05-06

## 2024-05-13 NOTE — PROGRESS NOTES
Cha Khan    Chief Complaint   Patient presents with    Spider Veins     Consult       History and Physical    Cha Khan is a 54 y.o. female with PMH significant for anxiety, depression.     she presents today for varicose veins.     Today she describes a cluster of painful reticular veins in the right anterolateral thigh. This at times becomes very painful, sharp pain occurs randomly and then resolves with massage. This has started about 3 months ago, reticular veins have been present for 2-3 years     Associated symptoms:   [] edema  [] varicose veins  [x] Heaviness in the right leg  [] fatigue  [] Pain  [] H/o or current ulcer(s)    Aggravating factors include none. Relieving factors include none.     Patient   [x] has   [] has not   been wearing compression stockings. Stockings are knee high, 20-30mm Hg compression strength and do not relieve symptoms      Relevant history:   [x] female gender  [x] Family history of venous disease: father, sister  [x] history of pregnancy:   [] history of DVT/PE  [] history of vein procedure  [x] Personal or family history of coronary artery disease - grandfather  of MI in his 50s      Pertinent edema history:  [] CHF/pulmonary HTN  [] JEANNETTE/snoring  [] CKD  [] Pulmonary disease  [] Obesity   [] Activity level    Smoking status: former smoker, quit in , smoked for a year    MRI technologist, on her feet a lot.         No pertinent imaging studies to review    The following labs were reviewed:   Lab Results   Component Value Date     2024    K 4.4 2024     2024    CO2 26 2024    BUN 12 2024    CREATININE 0.71 2024    GLUCOSE 85 2024    CALCIUM 9.3 2024    BILITOT 0.2 2024    ALKPHOS 97 2024    AST 18 2024    ALT 20 2024    LABGLOM 100 2024    AGRATIO 1.4 2024       Lab Results   Component Value Date    WBC 6.8 2024    HGB 15.0 2024    HCT

## 2024-05-14 ENCOUNTER — OFFICE VISIT (OUTPATIENT)
Age: 54
End: 2024-05-14
Payer: COMMERCIAL

## 2024-05-14 VITALS
BODY MASS INDEX: 28.93 KG/M2 | DIASTOLIC BLOOD PRESSURE: 80 MMHG | SYSTOLIC BLOOD PRESSURE: 138 MMHG | HEIGHT: 66 IN | HEART RATE: 76 BPM | WEIGHT: 180 LBS | OXYGEN SATURATION: 98 %

## 2024-05-14 DIAGNOSIS — I83.811 VARICOSE VEINS OF RIGHT LOWER EXTREMITY WITH PAIN: Primary | ICD-10-CM

## 2024-05-14 DIAGNOSIS — I78.1 VENOUS TELANGIECTASIA OF LOWER EXTREMITY: ICD-10-CM

## 2024-05-14 PROCEDURE — 99203 OFFICE O/P NEW LOW 30 MIN: CPT | Performed by: SURGERY

## 2024-07-22 ENCOUNTER — TRANSCRIBE ORDERS (OUTPATIENT)
Facility: HOSPITAL | Age: 54
End: 2024-07-22

## 2024-07-22 DIAGNOSIS — Z12.31 VISIT FOR SCREENING MAMMOGRAM: Primary | ICD-10-CM

## 2024-07-24 ENCOUNTER — HOSPITAL ENCOUNTER (OUTPATIENT)
Dept: WOMENS IMAGING | Facility: HOSPITAL | Age: 54
Discharge: HOME | End: 2024-07-27
Attending: INTERNAL MEDICINE
Payer: COMMERCIAL

## 2024-07-24 DIAGNOSIS — Z12.31 VISIT FOR SCREENING MAMMOGRAM: ICD-10-CM

## 2024-07-24 PROCEDURE — 77063 BREAST TOMOSYNTHESIS BI: CPT

## 2024-07-25 ENCOUNTER — OFFICE VISIT (OUTPATIENT)
Age: 54
End: 2024-07-25
Payer: COMMERCIAL

## 2024-07-25 VITALS
BODY MASS INDEX: 28.61 KG/M2 | SYSTOLIC BLOOD PRESSURE: 104 MMHG | OXYGEN SATURATION: 98 % | DIASTOLIC BLOOD PRESSURE: 60 MMHG | HEART RATE: 70 BPM | WEIGHT: 178 LBS | HEIGHT: 66 IN

## 2024-07-25 DIAGNOSIS — I78.1 VENOUS TELANGIECTASIA OF LOWER EXTREMITY: ICD-10-CM

## 2024-07-25 DIAGNOSIS — R60.0 EDEMA OF LEFT LOWER LEG: Primary | ICD-10-CM

## 2024-07-25 PROCEDURE — 99214 OFFICE O/P EST MOD 30 MIN: CPT | Performed by: SURGERY

## 2024-07-25 NOTE — PROGRESS NOTES
oriented x3 . Gait normal.   Psych: Appropriate mood and affect.   Skin:  Skin is warm and dry. No rash noted. No erythema. No ulcers.        Impression and Plan:  Cha Khan is a 54 y.o. female with RLE telangiectasias and symptoms of venous insufficiency .     LLE heaviness and aching pain. venous insufficiency study negative for reflux in the leg.     - will obtain iliocaval duplex to rule out iliac vein compression  - recommended continued compression stockings and leg elevation      2. RLE spider/reticular veins. Telangiectasias in the right lateral subdermal plexus.    Discussed options of sclerotherapy. Risks, benefits and pricing were discussed in detail, including risk of procedure failure, recurrence of spider veins, allergic reaction and hyperpigmentation. Discussed that this procedure is not covered by insurance.     Patient would like to proceed.        We reviewed the plan with the patient and the patient understands.        Past Medical History:   Diagnosis Date    Anxiety 1970s    Bell's palsy     Depression      Past Surgical History:   Procedure Laterality Date     SECTION  ,,    GYN      cerclage    GYN       x 3     Patient Active Problem List   Diagnosis    Pap smear of cervix with ASCUS, cannot exclude HGSIL    Abnormal mammogram    Acute medial meniscus tear of left knee    Nephrolithiasis    Anxiety     Current Outpatient Medications   Medication Sig Dispense Refill    diclofenac sodium (VOLTAREN) 1 % GEL Apply 4 g topically 4 times daily 350 g 1    escitalopram (LEXAPRO) 10 MG tablet Take 2 tablets by mouth daily 180 tablet 1    tiZANidine (ZANAFLEX) 2 MG tablet Take 1 tablet by mouth every 8 hours as needed (muscle spasm along the right side of the back) (Patient not taking: Reported on 2024) 10 tablet 0     No current facility-administered medications for this visit.     Allergies   Allergen Reactions    Epinephrine Anaphylaxis    Bupropion

## 2024-08-15 ENCOUNTER — TELEPHONE (OUTPATIENT)
Facility: CLINIC | Age: 54
End: 2024-08-15

## 2024-08-15 NOTE — TELEPHONE ENCOUNTER
----- Message from Edita RING sent at 8/15/2024  8:46 AM EDT -----  Regarding: ECC Referral Request  ECC Referral Request    Reason for referral request: Specialty Provider    Specialist/Lab/Test patient is requesting (if known):    Specialist Phone Number (if applicable):    Additional Information Patient was diagnosed to have a Nephrocalcinosis from her last ultrasound and would like to see a specialist for her condition . Not sure if she needs to have a Nephrologist or urologist but ask her doctor's advise .  --------------------------------------------------------------------------------------------------------------------------    Relationship to Patient: Self     Call Back Information: OK to leave message on voicemail  Preferred Call Back Number: Phone [phone]

## 2024-08-16 ENCOUNTER — OFFICE VISIT (OUTPATIENT)
Facility: CLINIC | Age: 54
End: 2024-08-16
Payer: COMMERCIAL

## 2024-08-16 ENCOUNTER — PATIENT MESSAGE (OUTPATIENT)
Facility: CLINIC | Age: 54
End: 2024-08-16

## 2024-08-16 VITALS
RESPIRATION RATE: 14 BRPM | DIASTOLIC BLOOD PRESSURE: 74 MMHG | HEIGHT: 66 IN | TEMPERATURE: 98.1 F | BODY MASS INDEX: 29.25 KG/M2 | OXYGEN SATURATION: 99 % | HEART RATE: 73 BPM | SYSTOLIC BLOOD PRESSURE: 127 MMHG | WEIGHT: 182 LBS

## 2024-08-16 DIAGNOSIS — R10.9 RIGHT FLANK PAIN: Primary | ICD-10-CM

## 2024-08-16 DIAGNOSIS — R60.0 LEG EDEMA, RIGHT: ICD-10-CM

## 2024-08-16 DIAGNOSIS — R53.83 OTHER FATIGUE: ICD-10-CM

## 2024-08-16 DIAGNOSIS — R10.11 RUQ PAIN: ICD-10-CM

## 2024-08-16 DIAGNOSIS — Z12.11 ENCOUNTER FOR SCREENING FOR MALIGNANT NEOPLASM OF COLON: ICD-10-CM

## 2024-08-16 DIAGNOSIS — R68.84 JAW PAIN: ICD-10-CM

## 2024-08-16 DIAGNOSIS — R82.90 ABNORMAL URINE: ICD-10-CM

## 2024-08-16 PROCEDURE — 99214 OFFICE O/P EST MOD 30 MIN: CPT | Performed by: INTERNAL MEDICINE

## 2024-08-16 NOTE — PROGRESS NOTES
Cha Khan is a 54 y.o. female (: 1970) presenting to address:    Chief Complaint   Patient presents with    Follow-up       Vitals:    24 1059   BP: 127/74   Pulse:    Resp:    Temp:    SpO2:        \"Have you been to the ER, urgent care clinic since your last visit?  Hospitalized since your last visit?\"    NO    “Have you seen or consulted any other health care providers outside of [facility] since your last visit?”    NO    “Have you had a colorectal cancer screening such as a colonoscopy/FIT/Cologuard?    NO    No colonoscopy on file  No cologuard on file  No FIT/FOBT on file   No flexible sigmoidoscopy on file         “Have you had a pap smear?”    NO    Date of last Cervical Cancer screen (HPV or PAP): 2015     Bp retake; 127/74      
08/20/2024 08:28 AM       Lab Results   Component Value Date/Time    CHOL 153 01/08/2024 09:48 AM    HDL 45 01/08/2024 09:48 AM    LDL 93 01/08/2024 09:48 AM    VLDL 15 01/08/2024 09:48 AM       No results found for: \"HBA1C\", \"[id number]\"    Assessment/Plan:   1. Right flank pain: She has incidental nephrocalcinosis seen on imaging per her history.  -Placing a referral to urology of [place] and ordering a dedicated retroperitoneal ultrasound for completeness.  - I encouraged her to hydrate well with water.    2. Leg edema, right: Secondary to venous insufficiency related findings.  -She will elevate her legs and follow-up with vascular surgery for her anticipated treatment.    3. RUQ pain: Associated with intermittent jaw pain.  I am not sure if these 2 symptoms are related to 1 another.  -Checking lab work for completeness.  - Ordering a carotid ultrasound study and an ultrasound of her liver for completeness.    4.  Health maintenance:  -We are ordering a Cologuard stool test for colon cancer screening.    5. Other fatigue: Likely situational due to her work life balance.  She has really lose weight.  -Checking TFTs for completeness.  -She will continue taking 20 mg daily of Lexapro given her history of stress.          ICD-10-CM    1. Right flank pain  R10.9 [facility]     US RETROPERITONEAL LIMITED      2. Abnormal urine  R82.90       3. Leg edema, right  R60.0       4. RUQ pain  R10.11 Comprehensive Metabolic Panel     CBC with Auto Differential     US ABDOMEN LIMITED      5. Encounter for screening for malignant neoplasm of colon  Z12.11 Cologuard (Fecal DNA Colorectal Cancer Screening)      6. Jaw pain  R68.84 Vascular duplex carotid bilateral      7. Other fatigue  R53.83 TSH + Free T4 Panel            Health Maintenance Due   Topic Date Due    Hepatitis B vaccine (1 of 3 - 19+ 3-dose series) Never done    DTaP/Tdap/Td vaccine (1 - Tdap) Never done    Colorectal Cancer Screen  Never done

## 2024-08-20 LAB
BASOPHILS # BLD AUTO: 0 X10E3/UL (ref 0–0.2)
BASOPHILS NFR BLD AUTO: 1 %
EOSINOPHIL # BLD AUTO: 0.2 X10E3/UL (ref 0–0.4)
EOSINOPHIL NFR BLD AUTO: 4 %
ERYTHROCYTE [DISTWIDTH] IN BLOOD BY AUTOMATED COUNT: 12.2 % (ref 11.7–15.4)
HCT VFR BLD AUTO: 42.4 % (ref 34–46.6)
HGB BLD-MCNC: 14.4 G/DL (ref 11.1–15.9)
IMM GRANULOCYTES # BLD AUTO: 0 X10E3/UL (ref 0–0.1)
IMM GRANULOCYTES NFR BLD AUTO: 0 %
LYMPHOCYTES # BLD AUTO: 2.2 X10E3/UL (ref 0.7–3.1)
LYMPHOCYTES NFR BLD AUTO: 35 %
MCH RBC QN AUTO: 28.7 PG (ref 26.6–33)
MCHC RBC AUTO-ENTMCNC: 34 G/DL (ref 31.5–35.7)
MCV RBC AUTO: 85 FL (ref 79–97)
MONOCYTES # BLD AUTO: 0.5 X10E3/UL (ref 0.1–0.9)
MONOCYTES NFR BLD AUTO: 8 %
NEUTROPHILS # BLD AUTO: 3.4 X10E3/UL (ref 1.4–7)
NEUTROPHILS NFR BLD AUTO: 52 %
PLATELET # BLD AUTO: 268 X10E3/UL (ref 150–450)
RBC # BLD AUTO: 5.01 X10E6/UL (ref 3.77–5.28)
WBC # BLD AUTO: 6.4 X10E3/UL (ref 3.4–10.8)

## 2024-08-21 LAB
ALBUMIN SERPL-MCNC: 4.1 G/DL (ref 3.8–4.9)
ALP SERPL-CCNC: 101 IU/L (ref 44–121)
ALT SERPL-CCNC: 25 IU/L (ref 0–32)
APPEARANCE UR: ABNORMAL
AST SERPL-CCNC: 27 IU/L (ref 0–40)
BACTERIA #/AREA URNS HPF: ABNORMAL /[HPF]
BILIRUB SERPL-MCNC: 0.4 MG/DL (ref 0–1.2)
BILIRUB UR QL STRIP: NEGATIVE
BUN SERPL-MCNC: 14 MG/DL (ref 6–24)
BUN/CREAT SERPL: 18 (ref 9–23)
CALCIUM SERPL-MCNC: 9.3 MG/DL (ref 8.7–10.2)
CASTS URNS QL MICRO: ABNORMAL /LPF
CHLORIDE SERPL-SCNC: 101 MMOL/L (ref 96–106)
CO2 SERPL-SCNC: 22 MMOL/L (ref 20–29)
COLOR UR: YELLOW
CREAT SERPL-MCNC: 0.78 MG/DL (ref 0.57–1)
EGFRCR SERPLBLD CKD-EPI 2021: 89 ML/MIN/1.73
EPI CELLS #/AREA URNS HPF: >10 /HPF (ref 0–10)
GLOBULIN SER CALC-MCNC: 3.2 G/DL (ref 1.5–4.5)
GLUCOSE SERPL-MCNC: 94 MG/DL (ref 70–99)
GLUCOSE UR QL STRIP: NEGATIVE
HGB UR QL STRIP: NEGATIVE
KETONES UR QL STRIP: NEGATIVE
LEUKOCYTE ESTERASE UR QL STRIP: ABNORMAL
MICRO URNS: ABNORMAL
NITRITE UR QL STRIP: POSITIVE
PH UR STRIP: 6 [PH] (ref 5–7.5)
POTASSIUM SERPL-SCNC: 5.3 MMOL/L (ref 3.5–5.2)
PROT SERPL-MCNC: 7.3 G/DL (ref 6–8.5)
PROT UR QL STRIP: ABNORMAL
RBC #/AREA URNS HPF: ABNORMAL /HPF (ref 0–2)
SODIUM SERPL-SCNC: 139 MMOL/L (ref 134–144)
SP GR UR STRIP: 1.02 (ref 1–1.03)
TSH SERPL DL<=0.005 MIU/L-ACNC: 1.79 UIU/ML (ref 0.45–4.5)
UROBILINOGEN UR STRIP-MCNC: 0.2 MG/DL (ref 0.2–1)
WBC #/AREA URNS HPF: >30 /HPF (ref 0–5)

## 2024-08-22 LAB — BACTERIA UR CULT: ABNORMAL

## 2024-08-22 ASSESSMENT — ENCOUNTER SYMPTOMS
ANAL BLEEDING: 0
COUGH: 0
SHORTNESS OF BREATH: 0
BLOOD IN STOOL: 0
EYE PAIN: 0
SORE THROAT: 0
ABDOMINAL PAIN: 1

## 2024-08-23 ENCOUNTER — HOSPITAL ENCOUNTER (OUTPATIENT)
Facility: HOSPITAL | Age: 54
End: 2024-08-23
Attending: INTERNAL MEDICINE
Payer: COMMERCIAL

## 2024-08-23 ENCOUNTER — HOSPITAL ENCOUNTER (OUTPATIENT)
Facility: HOSPITAL | Age: 54
Discharge: HOME | End: 2024-08-23
Attending: INTERNAL MEDICINE
Payer: COMMERCIAL

## 2024-08-23 DIAGNOSIS — R10.11 RUQ PAIN: ICD-10-CM

## 2024-08-23 DIAGNOSIS — R10.9 RIGHT FLANK PAIN: ICD-10-CM

## 2024-08-23 DIAGNOSIS — R68.84 JAW PAIN: ICD-10-CM

## 2024-08-23 LAB
VAS LEFT BULB EDV: 21 CM/S
VAS LEFT BULB PSV: 71.8 CM/S
VAS LEFT CCA DIST EDV: 34.4 CM/S
VAS LEFT CCA DIST PSV: 88.8 CM/S
VAS LEFT CCA MID EDV: 33.13 CM/S
VAS LEFT CCA MID PSV: 86.19 CM/S
VAS LEFT CCA PROX EDV: 29.3 CM/S
VAS LEFT CCA PROX PSV: 96.6 CM/S
VAS LEFT ECA EDV: 13.72 CM/S
VAS LEFT ECA PSV: 103 CM/S
VAS LEFT ICA DIST EDV: 34.4 CM/S
VAS LEFT ICA DIST PSV: 77.1 CM/S
VAS LEFT ICA MID EDV: 16.3 CM/S
VAS LEFT ICA MID PSV: 43.5 CM/S
VAS LEFT ICA PROX EDV: 18.9 CM/S
VAS LEFT ICA PROX PSV: 84.9 CM/S
VAS LEFT ICA/CCA PSV: 0.96 NO UNITS
VAS LEFT SUBCLAVIAN PROX EDV: 1.8 CM/S
VAS LEFT SUBCLAVIAN PROX PSV: 88.7 CM/S
VAS LEFT VERTEBRAL EDV: 16.69 CM/S
VAS LEFT VERTEBRAL PSV: 52.9 CM/S
VAS RIGHT BULB EDV: 15.3 CM/S
VAS RIGHT BULB PSV: 62.9 CM/S
VAS RIGHT CCA DIST EDV: 29.3 CM/S
VAS RIGHT CCA DIST PSV: 81 CM/S
VAS RIGHT CCA MID EDV: 27.95 CM/S
VAS RIGHT CCA MID PSV: 82.31 CM/S
VAS RIGHT CCA PROX EDV: 28 CM/S
VAS RIGHT CCA PROX PSV: 90.1 CM/S
VAS RIGHT ECA EDV: 15.01 CM/S
VAS RIGHT ECA PSV: 87.5 CM/S
VAS RIGHT ICA DIST EDV: 37.2 CM/S
VAS RIGHT ICA DIST PSV: 82.3 CM/S
VAS RIGHT ICA MID EDV: 30.4 CM/S
VAS RIGHT ICA MID PSV: 83.9 CM/S
VAS RIGHT ICA PROX EDV: 21.3 CM/S
VAS RIGHT ICA PROX PSV: 62.9 CM/S
VAS RIGHT ICA/CCA PSV: 1 NO UNITS
VAS RIGHT SUBCLAVIAN PROX EDV: 0 CM/S
VAS RIGHT SUBCLAVIAN PROX PSV: 79.5 CM/S
VAS RIGHT VERTEBRAL EDV: 10.99 CM/S
VAS RIGHT VERTEBRAL PSV: 35.4 CM/S

## 2024-08-23 PROCEDURE — 93880 EXTRACRANIAL BILAT STUDY: CPT

## 2024-08-23 PROCEDURE — 76770 US EXAM ABDO BACK WALL COMP: CPT

## 2024-08-23 PROCEDURE — 76705 ECHO EXAM OF ABDOMEN: CPT

## 2024-08-23 PROCEDURE — 93880 EXTRACRANIAL BILAT STUDY: CPT | Performed by: INTERNAL MEDICINE

## 2024-08-26 DIAGNOSIS — N29 NEPHROCALCINOSIS: ICD-10-CM

## 2024-08-26 DIAGNOSIS — E83.59 NEPHROCALCINOSIS: ICD-10-CM

## 2024-08-26 DIAGNOSIS — N30.00 ACUTE CYSTITIS WITHOUT HEMATURIA: Primary | ICD-10-CM

## 2024-08-26 DIAGNOSIS — N32.89 BLADDER WALL THICKENING: ICD-10-CM

## 2024-08-26 RX ORDER — CIPROFLOXACIN 250 MG/1
250 TABLET, FILM COATED ORAL 2 TIMES DAILY
Qty: 6 TABLET | Refills: 0 | Status: SHIPPED | OUTPATIENT
Start: 2024-08-26 | End: 2024-08-29

## 2024-08-27 ENCOUNTER — TELEPHONE (OUTPATIENT)
Facility: CLINIC | Age: 54
End: 2024-08-27

## 2024-08-27 NOTE — TELEPHONE ENCOUNTER
----- Message from Dr. Lexy Ashford MD sent at 8/26/2024  4:56 PM EDT -----  Please let her know that I am ordering a course of ciprofloxacin for presumed UTI per her urine culture result.  Her retroperitoneal ultrasound findings are consistent with bladder wall thickening which could be secondary to her recent UTI.  Due to this finding that is nonspecific, I am referring her to urology for a checkup.  Meanwhile, her carotid PVL study is normal.  Right kidney calcification (nephrocalcinosis) is stable in appearance per her retroperitoneal ultrasound findings.      Dr. Lexy Ashford  Internists of [address], [address]  [place] [address]  Phone: [phone]  Fax: [phone]

## 2024-08-27 NOTE — TELEPHONE ENCOUNTER
Called pt to notify of results. Pt verbalized understanding. Pt stated that she was having difficulty getting the cipro yesterday as she was told by the pharmacy that they were having difficulty getting it approved. Asked if Good rx was used and pt stated no and script was canceled. Contacted pharmacy and updated w/good rx. Script was processed successful. Notified pt. Pt verbalized understanding.    LEXI Ng LPN

## 2024-09-02 LAB — NONINV COLON CA DNA+OCC BLD SCRN STL QL: NEGATIVE

## 2024-09-04 ENCOUNTER — TELEPHONE (OUTPATIENT)
Facility: CLINIC | Age: 54
End: 2024-09-04

## 2024-09-04 NOTE — TELEPHONE ENCOUNTER
----- Message from Dr. Lexy Ashford MD sent at 9/3/2024  9:40 AM EDT -----  Please let her know that her cologuard test is negative.    Dr. Lexy Ashford  Internists of [address], [address]  [place] [id number]  Phone: [phone]  Fax: [phone]

## 2024-09-05 ENCOUNTER — OFFICE VISIT (OUTPATIENT)
Age: 54
End: 2024-09-05
Payer: COMMERCIAL

## 2024-09-05 VITALS
HEIGHT: 66 IN | DIASTOLIC BLOOD PRESSURE: 64 MMHG | OXYGEN SATURATION: 98 % | BODY MASS INDEX: 28.93 KG/M2 | RESPIRATION RATE: 18 BRPM | HEART RATE: 78 BPM | SYSTOLIC BLOOD PRESSURE: 126 MMHG | WEIGHT: 180 LBS

## 2024-09-05 DIAGNOSIS — R60.0 EDEMA OF LEFT LOWER LEG: ICD-10-CM

## 2024-09-05 DIAGNOSIS — I73.9 CLAUDICATION OF LEFT LOWER EXTREMITY (HCC): Primary | ICD-10-CM

## 2024-09-05 DIAGNOSIS — I78.1 VENOUS TELANGIECTASIA OF LOWER EXTREMITY: ICD-10-CM

## 2024-09-05 PROCEDURE — 99213 OFFICE O/P EST LOW 20 MIN: CPT | Performed by: SURGERY

## 2024-09-05 ASSESSMENT — PATIENT HEALTH QUESTIONNAIRE - PHQ9
1. LITTLE INTEREST OR PLEASURE IN DOING THINGS: NOT AT ALL
SUM OF ALL RESPONSES TO PHQ9 QUESTIONS 1 & 2: 0
SUM OF ALL RESPONSES TO PHQ QUESTIONS 1-9: 0
SUM OF ALL RESPONSES TO PHQ QUESTIONS 1-9: 0
2. FEELING DOWN, DEPRESSED OR HOPELESS: NOT AT ALL
SUM OF ALL RESPONSES TO PHQ QUESTIONS 1-9: 0
SUM OF ALL RESPONSES TO PHQ QUESTIONS 1-9: 0

## 2024-09-05 NOTE — PROGRESS NOTES
hyperpigmentation, no lipodermatosclerosis. Varicose veins are not present, scattered reticular veins in the right lateral subdermal plexus. Digits no cyanosis or clubbing  Neurological:  she  is alert and oriented x3 . Gait normal.   Psych: Appropriate mood and affect.   Skin:  Skin is warm and dry. No rash noted. No erythema. No ulcers.        Impression and Plan:  Cha Khan is a 54 y.o. female with RLE telangiectasias and symptoms of venous insufficiency .     LLE heaviness and aching pain, worse with ambulation. venous insufficiency study negative for reflux in the leg. IVC duplex negative for iliac vein compression    - will obtain LYDIA with exercise to rule out occult aortoiliac lesion  - recommended continued compression as well as exercise      2. RLE spider/reticular veins. Telangiectasias in the right lateral subdermal plexus.    Discussed options of sclerotherapy. Risks, benefits and pricing were discussed in detail, including risk of procedure failure, recurrence of spider veins, allergic reaction and hyperpigmentation. Discussed that this procedure is not covered by insurance.     Patient would like to proceed.        We reviewed the plan with the patient and the patient understands.        Past Medical History:   Diagnosis Date    Anxiety 1970s    Bell's palsy     Depression      Past Surgical History:   Procedure Laterality Date     SECTION  ,,    GYN      cerclage    GYN       x 3     Patient Active Problem List   Diagnosis    Pap smear of cervix with ASCUS, cannot exclude HGSIL    Abnormal mammogram    Acute medial meniscus tear of left knee    Nephrolithiasis    Anxiety     Current Outpatient Medications   Medication Sig Dispense Refill    diclofenac sodium (VOLTAREN) 1 % GEL Apply 4 g topically 4 times daily 350 g 1    tiZANidine (ZANAFLEX) 2 MG tablet Take 1 tablet by mouth every 8 hours as needed (muscle spasm along the right side of the back) 10 tablet 0

## 2024-11-25 DIAGNOSIS — F41.9 ANXIETY: ICD-10-CM

## 2024-11-25 RX ORDER — ESCITALOPRAM OXALATE 10 MG
10 TABLET ORAL DAILY
Qty: 90 TABLET | Refills: 1 | Status: SHIPPED | OUTPATIENT
Start: 2024-11-25

## 2024-12-13 PROBLEM — L03.113 CELLULITIS OF RIGHT UPPER LIMB: Status: ACTIVE | Noted: 2017-09-06

## 2024-12-13 PROBLEM — M54.31 RIGHT SCIATIC NERVE PAIN: Status: ACTIVE | Noted: 2024-12-13

## 2024-12-13 PROBLEM — M54.41 ACUTE RIGHT-SIDED LOW BACK PAIN WITH RIGHT-SIDED SCIATICA: Status: ACTIVE | Noted: 2024-12-13

## 2024-12-16 ENCOUNTER — TELEPHONE (OUTPATIENT)
Facility: CLINIC | Age: 54
End: 2024-12-16

## 2024-12-16 NOTE — TELEPHONE ENCOUNTER
Received fax from pharmacy ([facility]) stating that Lexapro is not covered by pt plan. The preferred alternative is Fluoxetine Cap, Citalopram tab, Sertraline tab, Escitalopram tab, or Paroxetine Tab.     Pharmacy request that new prescription to include strength, directions, quantity, and refills.    LEXI Ng LPN

## 2024-12-17 RX ORDER — ESCITALOPRAM OXALATE 10 MG/1
10 TABLET ORAL DAILY
Qty: 90 TABLET | Refills: 3 | Status: SHIPPED | OUTPATIENT
Start: 2024-12-17

## 2024-12-17 RX ORDER — ESCITALOPRAM OXALATE 10 MG/1
10 TABLET ORAL DAILY
Qty: 90 TABLET | Refills: 1 | Status: SHIPPED | OUTPATIENT
Start: 2024-12-17 | End: 2024-12-17 | Stop reason: ALTCHOICE

## 2024-12-17 NOTE — TELEPHONE ENCOUNTER
Called pt to notify of provider msg:     Please let her know that I ordered generic Lexapro (escitalopram) and sent the prescription to her pharmacy. This prescription can be picked up another to see if her insurance will not cover the medication. She can use a coupon on good [email or web address].     Pt stated she is aware that her ins does not cover Lexapro and she has been paying out of pocket for the drug for the last 9 years. Pt is requesting script for the brand name only. Pt is asking if it can be written as \"JEOVANNY\" (dispense as written).    Please advise.    LEXI Ng LPN

## 2024-12-17 NOTE — TELEPHONE ENCOUNTER
Branded lexapro prescribed.      Dr. Lexy Ashford  Internists of [address], [address]  [place] [address]  Phone: [phone]  Fax: [phone]

## 2024-12-17 NOTE — TELEPHONE ENCOUNTER
Please let her know that I ordered generic Lexapro (escitalopram) and sent the prescription to her pharmacy.  This prescription can be picked up another to see if her insurance will not cover the medication.  She can use a coupon on good [email or web address].      Dr. Lexy Ashford  Internists of [address], [address]  [place] [address]  Phone: [phone]  Fax: [phone]

## 2024-12-31 ENCOUNTER — HOSPITAL ENCOUNTER (OUTPATIENT)
Facility: HOSPITAL | Age: 54
Discharge: HOME | End: 2025-01-03
Attending: UROLOGY
Payer: COMMERCIAL

## 2024-12-31 DIAGNOSIS — N20.0 NEPHROLITHIASIS: ICD-10-CM

## 2024-12-31 DIAGNOSIS — N39.0 RECURRENT UTI: ICD-10-CM

## 2024-12-31 PROCEDURE — 74176 CT ABD & PELVIS W/O CONTRAST: CPT

## 2025-03-17 ENCOUNTER — OFFICE VISIT (OUTPATIENT)
Facility: CLINIC | Age: 55
End: 2025-03-17
Payer: COMMERCIAL

## 2025-03-17 VITALS
HEART RATE: 87 BPM | DIASTOLIC BLOOD PRESSURE: 72 MMHG | TEMPERATURE: 97.3 F | BODY MASS INDEX: 29.92 KG/M2 | OXYGEN SATURATION: 99 % | SYSTOLIC BLOOD PRESSURE: 132 MMHG | WEIGHT: 186.2 LBS | HEIGHT: 66 IN | RESPIRATION RATE: 18 BRPM

## 2025-03-17 DIAGNOSIS — E87.5 HYPERKALEMIA: ICD-10-CM

## 2025-03-17 DIAGNOSIS — R73.03 PREDIABETES: ICD-10-CM

## 2025-03-17 DIAGNOSIS — R00.2 PALPITATIONS: ICD-10-CM

## 2025-03-17 DIAGNOSIS — Z00.00 ROUTINE GENERAL MEDICAL EXAMINATION AT A HEALTH CARE FACILITY: Primary | ICD-10-CM

## 2025-03-17 DIAGNOSIS — Z12.31 ENCOUNTER FOR SCREENING MAMMOGRAM FOR BREAST CANCER: ICD-10-CM

## 2025-03-17 PROCEDURE — 99396 PREV VISIT EST AGE 40-64: CPT | Performed by: INTERNAL MEDICINE

## 2025-03-17 SDOH — ECONOMIC STABILITY: FOOD INSECURITY: WITHIN THE PAST 12 MONTHS, YOU WORRIED THAT YOUR FOOD WOULD RUN OUT BEFORE YOU GOT MONEY TO BUY MORE.: NEVER TRUE

## 2025-03-17 SDOH — ECONOMIC STABILITY: FOOD INSECURITY: WITHIN THE PAST 12 MONTHS, THE FOOD YOU BOUGHT JUST DIDN'T LAST AND YOU DIDN'T HAVE MONEY TO GET MORE.: NEVER TRUE

## 2025-03-17 ASSESSMENT — ENCOUNTER SYMPTOMS
ABDOMINAL PAIN: 0
BLOOD IN STOOL: 0
COUGH: 0
EYE PAIN: 0
SORE THROAT: 0
ANAL BLEEDING: 0
SHORTNESS OF BREATH: 0

## 2025-03-17 ASSESSMENT — PATIENT HEALTH QUESTIONNAIRE - PHQ9
5. POOR APPETITE OR OVEREATING: NOT AT ALL
4. FEELING TIRED OR HAVING LITTLE ENERGY: NOT AT ALL
10. IF YOU CHECKED OFF ANY PROBLEMS, HOW DIFFICULT HAVE THESE PROBLEMS MADE IT FOR YOU TO DO YOUR WORK, TAKE CARE OF THINGS AT HOME, OR GET ALONG WITH OTHER PEOPLE: NOT DIFFICULT AT ALL
SUM OF ALL RESPONSES TO PHQ QUESTIONS 1-9: 0
8. MOVING OR SPEAKING SO SLOWLY THAT OTHER PEOPLE COULD HAVE NOTICED. OR THE OPPOSITE, BEING SO FIGETY OR RESTLESS THAT YOU HAVE BEEN MOVING AROUND A LOT MORE THAN USUAL: NOT AT ALL
2. FEELING DOWN, DEPRESSED OR HOPELESS: NOT AT ALL
3. TROUBLE FALLING OR STAYING ASLEEP: NOT AT ALL
SUM OF ALL RESPONSES TO PHQ QUESTIONS 1-9: 0
SUM OF ALL RESPONSES TO PHQ QUESTIONS 1-9: 0
9. THOUGHTS THAT YOU WOULD BE BETTER OFF DEAD, OR OF HURTING YOURSELF: NOT AT ALL
1. LITTLE INTEREST OR PLEASURE IN DOING THINGS: NOT AT ALL
6. FEELING BAD ABOUT YOURSELF - OR THAT YOU ARE A FAILURE OR HAVE LET YOURSELF OR YOUR FAMILY DOWN: NOT AT ALL
SUM OF ALL RESPONSES TO PHQ QUESTIONS 1-9: 0
7. TROUBLE CONCENTRATING ON THINGS, SUCH AS READING THE NEWSPAPER OR WATCHING TELEVISION: NOT AT ALL

## 2025-03-17 NOTE — PROGRESS NOTES
Cha Khan presents today for   Chief Complaint   Patient presents with    Annual Exam           \"Have you been to the ER, urgent care clinic since your last visit?  Hospitalized since your last visit?\"    NO    “Have you seen or consulted any other health care providers outside of [facility] since your last visit?”    YES - When: approximately 3 months ago.  Where and Why: [facility]-consultation.        “Have you had a pap smear?”    YES - Where: [facility]  Nurse/CMA to request most recent records if not in the chart    Date of last Cervical Cancer screen (HPV or PAP): 9/18/2015           
    Basic Metabolic Panel     EKG 12 lead     [facility] - Jorge De Paz MD, Cardiology, [place] ([facility])            Health Maintenance Due   Topic Date Due    Hepatitis B vaccine (1 of 3 - 19+ 3-dose series) Never done    DTaP/Tdap/Td vaccine (1 - Tdap) Never done    Shingles vaccine (1 of 2) Never done    Pneumococcal 50+ years Vaccine (1 of 1 - PCV) Never done    Cervical cancer screen  09/18/2020    Flu vaccine (1) 08/01/2024    COVID-19 Vaccine (1 - 2024-25 season) Never done         Lab review: labs are reviewed in the EHR and ordered as mentioned above    I have discussed the diagnosis with the patient and the intended plan as seen in the above orders.  The patient has received an after-visit summary and questions were answered concerning future plans.  I have discussed medication side effects and warnings with the patient as well. I have reviewed the plan of care with the patient, accepted their input and they are in agreement with the treatment goals. All questions were answered. The patient understands the plan of care. Handouts provided today with above information. Pt instructed if symptoms worsen to call the office or report to the ED for continued care.  Greater than 50% of the visit time was spent in counseling and/or coordination of care.      Voice recognition was used to generate this report, which may have resulted in some phonetic based errors in grammar and contents. Even though attempts were made to correct all the mistakes, some may have been missed, and remained in the body of the document.      No follow-up provider specified.    Lexy Ashford MD

## 2025-03-31 ENCOUNTER — HOSPITAL ENCOUNTER (OUTPATIENT)
Facility: HOSPITAL | Age: 55
Discharge: HOME | End: 2025-04-03
Payer: COMMERCIAL

## 2025-03-31 DIAGNOSIS — R00.2 PALPITATIONS: ICD-10-CM

## 2025-03-31 LAB
EKG ATRIAL RATE: 71 BPM
EKG DIAGNOSIS: NORMAL
EKG P AXIS: 53 DEGREES
EKG P-R INTERVAL: 136 MS
EKG Q-T INTERVAL: 388 MS
EKG QRS DURATION: 72 MS
EKG QTC CALCULATION (BAZETT): 421 MS
EKG R AXIS: 47 DEGREES
EKG T AXIS: 52 DEGREES
EKG VENTRICULAR RATE: 71 BPM

## 2025-03-31 PROCEDURE — 93010 ELECTROCARDIOGRAM REPORT: CPT | Performed by: INTERNAL MEDICINE

## 2025-03-31 PROCEDURE — 93005 ELECTROCARDIOGRAM TRACING: CPT

## 2025-04-01 ENCOUNTER — RESULTS FOLLOW-UP (OUTPATIENT)
Facility: CLINIC | Age: 55
End: 2025-04-01

## 2025-04-01 NOTE — TELEPHONE ENCOUNTER
----- Message from Dr. Lexy Ashford MD sent at 4/1/2025 12:46 AM EDT -----  Please let her know that her EKG is normal.    Dr. Lexy Ashford  Internists of [address], [address]  [place] [address]  Phone: [phone]  Fax: [phone]

## 2025-04-25 ENCOUNTER — PATIENT MESSAGE (OUTPATIENT)
Facility: CLINIC | Age: 55
End: 2025-04-25

## 2025-04-29 LAB
BASOPHILS # BLD AUTO: 0 X10E3/UL (ref 0–0.2)
BASOPHILS NFR BLD AUTO: 1 %
BUN SERPL-MCNC: 12 MG/DL (ref 6–24)
BUN/CREAT SERPL: 17 (ref 9–23)
CALCIUM SERPL-MCNC: 9.3 MG/DL (ref 8.7–10.2)
CHLORIDE SERPL-SCNC: 104 MMOL/L (ref 96–106)
CO2 SERPL-SCNC: 23 MMOL/L (ref 20–29)
CREAT SERPL-MCNC: 0.7 MG/DL (ref 0.57–1)
EGFRCR SERPLBLD CKD-EPI 2021: 101 ML/MIN/1.73
EOSINOPHIL # BLD AUTO: 0.3 X10E3/UL (ref 0–0.4)
EOSINOPHIL NFR BLD AUTO: 4 %
ERYTHROCYTE [DISTWIDTH] IN BLOOD BY AUTOMATED COUNT: 12.7 % (ref 11.7–15.4)
GLUCOSE SERPL-MCNC: 83 MG/DL (ref 70–99)
HBA1C MFR BLD: 5.6 % (ref 4.8–5.6)
HCT VFR BLD AUTO: 46.2 % (ref 34–46.6)
HGB BLD-MCNC: 15.1 G/DL (ref 11.1–15.9)
IMM GRANULOCYTES # BLD AUTO: 0 X10E3/UL (ref 0–0.1)
IMM GRANULOCYTES NFR BLD AUTO: 0 %
LYMPHOCYTES # BLD AUTO: 3.3 X10E3/UL (ref 0.7–3.1)
LYMPHOCYTES NFR BLD AUTO: 37 %
MCH RBC QN AUTO: 29.2 PG (ref 26.6–33)
MCHC RBC AUTO-ENTMCNC: 32.7 G/DL (ref 31.5–35.7)
MCV RBC AUTO: 89 FL (ref 79–97)
MONOCYTES # BLD AUTO: 0.5 X10E3/UL (ref 0.1–0.9)
MONOCYTES NFR BLD AUTO: 5 %
NEUTROPHILS # BLD AUTO: 4.7 X10E3/UL (ref 1.4–7)
NEUTROPHILS NFR BLD AUTO: 53 %
PLATELET # BLD AUTO: 261 X10E3/UL (ref 150–450)
POTASSIUM SERPL-SCNC: 4.2 MMOL/L (ref 3.5–5.2)
RBC # BLD AUTO: 5.17 X10E6/UL (ref 3.77–5.28)
SODIUM SERPL-SCNC: 141 MMOL/L (ref 134–144)
WBC # BLD AUTO: 8.9 X10E3/UL (ref 3.4–10.8)

## 2025-05-23 ENCOUNTER — OFFICE VISIT (OUTPATIENT)
Age: 55
End: 2025-05-23
Payer: COMMERCIAL

## 2025-05-23 VITALS
SYSTOLIC BLOOD PRESSURE: 118 MMHG | DIASTOLIC BLOOD PRESSURE: 80 MMHG | HEIGHT: 66 IN | HEART RATE: 96 BPM | OXYGEN SATURATION: 97 % | WEIGHT: 179 LBS | BODY MASS INDEX: 28.77 KG/M2

## 2025-05-23 DIAGNOSIS — R00.2 PALPITATION: ICD-10-CM

## 2025-05-23 DIAGNOSIS — R00.2 PALPITATIONS: ICD-10-CM

## 2025-05-23 DIAGNOSIS — R07.9 CHEST PAIN, UNSPECIFIED TYPE: Primary | ICD-10-CM

## 2025-05-23 PROCEDURE — 99204 OFFICE O/P NEW MOD 45 MIN: CPT | Performed by: INTERNAL MEDICINE

## 2025-05-23 NOTE — PROGRESS NOTES
her brother, brother, and father; COPD in her mother; Cancer in her brother, brother, and father; Diabetes in her mother, sister, and sister; Hypertension in her mother and sister; Thyroid Disease in her father.    Review of Systems  Except as stated above include:  Constitutional: Negative for fever, chills and malaise/fatigue.   HEENT: No congestion or recent URI.  Gastrointestinal: No nausea, vomiting, abdominal pain, bloody stools.  Pulmonary:  Negative except as stated above.  Cardiac:  Negative except as stated above.  Musculoskeletal: Negative except as stated above.  Neurological:  No localized symptoms.  Endocrine:  Negative except as stated above.    PHYSICAL EXAM  BP Readings from Last 3 Encounters:   05/23/25 118/80   03/17/25 132/72   09/05/24 126/64     Pulse Readings from Last 3 Encounters:   05/23/25 96   03/17/25 87   09/05/24 78     General:   Well developed, well groomed.    Head/Neck:   No obvious jugular venous distention     No obvious carotid pulsations.      No evidence of xanthelasma.  Lungs:   No respiratory distress.      Good respiratory effort  Heart:  No obvious pulsations  Abdomen:   No guarding  Extremities:   Well perfused    No significant edema.    Neurological:   Alert and oriented to person, place, time.      No focal neurological deficit visually.  Skin:   No obvious rash

## 2025-06-24 ENCOUNTER — PATIENT MESSAGE (OUTPATIENT)
Age: 55
End: 2025-06-24

## 2025-07-24 ENCOUNTER — OFFICE VISIT (OUTPATIENT)
Age: 55
End: 2025-07-24
Payer: COMMERCIAL

## 2025-07-24 ENCOUNTER — PATIENT MESSAGE (OUTPATIENT)
Age: 55
End: 2025-07-24

## 2025-07-24 VITALS
HEART RATE: 82 BPM | OXYGEN SATURATION: 96 % | SYSTOLIC BLOOD PRESSURE: 122 MMHG | BODY MASS INDEX: 28.77 KG/M2 | HEIGHT: 66 IN | DIASTOLIC BLOOD PRESSURE: 86 MMHG | WEIGHT: 179 LBS

## 2025-07-24 DIAGNOSIS — R00.2 PALPITATION: Primary | ICD-10-CM

## 2025-07-24 DIAGNOSIS — I47.19 ATRIAL TACHYCARDIA: ICD-10-CM

## 2025-07-24 DIAGNOSIS — R07.9 CHEST PAIN, UNSPECIFIED TYPE: ICD-10-CM

## 2025-07-24 PROCEDURE — 99214 OFFICE O/P EST MOD 30 MIN: CPT | Performed by: INTERNAL MEDICINE

## 2025-07-24 RX ORDER — ASPIRIN 81 MG/1
81 TABLET ORAL DAILY
Qty: 90 TABLET | Refills: 0 | Status: SHIPPED | OUTPATIENT
Start: 2025-07-24

## 2025-07-24 RX ORDER — METOPROLOL SUCCINATE 25 MG/1
25 TABLET, EXTENDED RELEASE ORAL DAILY
Qty: 90 TABLET | Refills: 3 | Status: SHIPPED | OUTPATIENT
Start: 2025-07-24

## 2025-07-24 NOTE — PROGRESS NOTES
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  She was having intermittent palpitations and fluttering in her chest, starts and stops, usually brief without syncope.  She had treadmill stress test that was low risk and reached target heart rate.  Echocardiogram was unremarkable, but her event monitor did show a number of episodes of suspect atrial tachycardia with borderline atrial fibrillation but nothing more than 10 seconds.     Impression:  1. Intermittent chest pain with treadmill stress test that was low risk and she did reach target heart rate.  2. Echocardiogram in June 2025 with normal EF.  3. Paroxysmal short runs of atrial tachycardia with borderline atrial fibrillation but nothing more than 10 seconds by event monitor.  4. History of PACs and PVCs.  5. CT calcium score of 0 back in 2023.  6. BMI of 28.  7. Currently working as MRI technician.    Plan:  I discussed the diagnosis of short runs of atrial tachycardia, also can conclude very short duration atrial fibrillation.  These are never more than 10 seconds, but still we talked about just a baby aspirin for now.  I talked about use of low-dose Toprol 25 mg daily.  We also talked about weight loss and consideration for sleep apnea evaluation depending upon how she does.  I would like to obtain an exercise nuclear stress test to completely exclude ischemia because if she has more symptoms, I talked about the possible use of a Class IC antiarrhythmic and if she has more prolonged episodes, even potentially ablation.        Wt Readings from Last 3 Encounters:   07/24/25 81.2 kg (179 lb)   06/25/25 81.2 kg (179 lb)   05/23/25 81.2 kg (179 lb)     Past Medical History:   Diagnosis Date    Acute cystitis without hematuria     Anxiety 1970s    Bell's palsy     Bladder wall thickening     Depression     Kidney stone     Nephrocalcinosis     Right flank pain     Simple renal cyst        Current Outpatient Medications   Medication Sig Dispense Refill

## 2025-08-04 ENCOUNTER — PATIENT MESSAGE (OUTPATIENT)
Facility: CLINIC | Age: 55
End: 2025-08-04